# Patient Record
Sex: MALE | Race: WHITE | NOT HISPANIC OR LATINO | Employment: UNEMPLOYED | URBAN - NONMETROPOLITAN AREA
[De-identification: names, ages, dates, MRNs, and addresses within clinical notes are randomized per-mention and may not be internally consistent; named-entity substitution may affect disease eponyms.]

---

## 2019-12-22 ENCOUNTER — APPOINTMENT (OUTPATIENT)
Dept: GENERAL RADIOLOGY | Facility: HOSPITAL | Age: 24
End: 2019-12-22

## 2019-12-22 ENCOUNTER — HOSPITAL ENCOUNTER (EMERGENCY)
Facility: HOSPITAL | Age: 24
Discharge: HOME | End: 2019-12-22
Attending: EMERGENCY MEDICINE | Admitting: EMERGENCY MEDICINE

## 2019-12-22 VITALS
DIASTOLIC BLOOD PRESSURE: 86 MMHG | RESPIRATION RATE: 16 BRPM | HEART RATE: 88 BPM | OXYGEN SATURATION: 100 % | SYSTOLIC BLOOD PRESSURE: 143 MMHG | HEIGHT: 73 IN | WEIGHT: 165 LBS | TEMPERATURE: 97.6 F | BODY MASS INDEX: 21.87 KG/M2

## 2019-12-22 DIAGNOSIS — S92.414A CLOSED NONDISPLACED FRACTURE OF PROXIMAL PHALANX OF RIGHT GREAT TOE, INITIAL ENCOUNTER: Primary | ICD-10-CM

## 2019-12-22 PROCEDURE — 73660 X-RAY EXAM OF TOE(S): CPT

## 2019-12-22 PROCEDURE — 99283 EMERGENCY DEPT VISIT LOW MDM: CPT

## 2019-12-22 RX ORDER — DICLOFENAC SODIUM 75 MG/1
75 TABLET, DELAYED RELEASE ORAL 2 TIMES DAILY PRN
Qty: 20 TABLET | Refills: 0 | Status: SHIPPED | OUTPATIENT
Start: 2019-12-22 | End: 2020-12-28 | Stop reason: HOSPADM

## 2019-12-22 RX ORDER — HYDROCODONE BITARTRATE AND ACETAMINOPHEN 5; 325 MG/1; MG/1
1 TABLET ORAL ONCE
Status: COMPLETED | OUTPATIENT
Start: 2019-12-22 | End: 2019-12-22

## 2019-12-22 RX ADMIN — HYDROCODONE BITARTRATE AND ACETAMINOPHEN 1 TABLET: 5; 325 TABLET ORAL at 07:35

## 2019-12-22 NOTE — ED NOTES
Received report from Silva BRAUN at this time. Pt resting on ED stretcher watching TV. Pt A&Ox4. VSS. No acute respiratory distress noted. Respirations even and nonlabored. Pt c/o pain in right great toe. Updated pt on plan of care and wait times. All needs and concerns addressed. WCTM.     Nasrin Mojica RN  12/22/19 [id number]

## 2019-12-22 NOTE — ED PROVIDER NOTES
Subjective   Patient is a 24-year-old male that presents to the ED for complaint of right great toe pain.  The patient says that he was walking through the house in the dark whenever he stubbed his toe on a box of wood.  The patient says that he has had difficulty walking since.  He reports swelling and redness to the right great toe as well.  He denies any other injury.      History provided by:  Patient   used: No    Toe Injury   Location:  Toe  Time since incident:  6 hours  Injury: yes    Toe location:  R great toe  Pain details:     Quality:  Aching and throbbing    Radiates to:  Does not radiate    Severity:  Moderate    Onset quality:  Sudden    Progression:  Unchanged  Chronicity:  New  Dislocation: no    Foreign body present:  No foreign bodies  Tetanus status:  Unknown  Prior injury to area:  No  Relieved by:  Nothing  Worsened by:  Bearing weight  Ineffective treatments:  None tried  Associated symptoms: decreased ROM and swelling    Risk factors: no concern for non-accidental trauma, no frequent fractures, no obesity and no recent illness        Review of Systems   Constitutional: Negative.    HENT: Negative.    Eyes: Negative.    Respiratory: Negative.    Cardiovascular: Negative.    Gastrointestinal: Negative.    Endocrine: Negative.    Genitourinary: Negative.    Musculoskeletal: Positive for gait problem.   Skin: Negative.    Allergic/Immunologic: Negative.    Hematological: Negative.    Psychiatric/Behavioral: Negative.    All other systems reviewed and are negative.      No past medical history on file.    No Known Allergies    No past surgical history on file.    No family history on file.    Social History     Socioeconomic History   • Marital status: Single     Spouse name: Not on file   • Number of children: Not on file   • Years of education: Not on file   • Highest education level: Not on file           Objective   Physical Exam   Constitutional: He is oriented to person,  place, and time. He appears well-developed and well-nourished.   HENT:   Head: Normocephalic.   Eyes: Pupils are equal, round, and reactive to light. EOM are normal.   Neck: Normal range of motion. Neck supple.   Cardiovascular: Normal rate, regular rhythm, normal heart sounds and intact distal pulses.   Pulmonary/Chest: Effort normal and breath sounds normal.   Abdominal: Soft. Bowel sounds are normal.   Musculoskeletal: He exhibits tenderness.   Tenderness, erythema, swelling to right great toe.  Limited range of motion related to pain.   Neurological: He is alert and oriented to person, place, and time.   Skin: Skin is warm. Capillary refill takes less than 2 seconds.   Psychiatric: He has a normal mood and affect. His behavior is normal. Judgment and thought content normal.   Nursing note and vitals reviewed.      Procedures           ED Course                      No data recorded                        MDM  Number of Diagnoses or Management Options  Closed nondisplaced fracture of proximal phalanx of right great toe, initial encounter: new and requires workup     Amount and/or Complexity of Data Reviewed  Tests in the radiology section of CPT®: reviewed and ordered    Risk of Complications, Morbidity, and/or Mortality  Presenting problems: moderate  Diagnostic procedures: moderate  Management options: moderate  General comments: Right great toe fracture  Dieter taped, boot placed  Follow up with orthopedics   NSAIDS given    Patient Progress  Patient progress: improved      Final diagnoses:   Closed nondisplaced fracture of proximal phalanx of right great toe, initial encounter              Rhianna Vu, APRN  12/22/19 0736

## 2019-12-28 ENCOUNTER — HOSPITAL ENCOUNTER (EMERGENCY)
Facility: HOSPITAL | Age: 24
Discharge: HOME | End: 2019-12-28
Attending: EMERGENCY MEDICINE | Admitting: EMERGENCY MEDICINE

## 2019-12-28 VITALS
BODY MASS INDEX: 21.67 KG/M2 | HEART RATE: 60 BPM | TEMPERATURE: 98 F | HEIGHT: 72 IN | RESPIRATION RATE: 18 BRPM | DIASTOLIC BLOOD PRESSURE: 72 MMHG | SYSTOLIC BLOOD PRESSURE: 118 MMHG | OXYGEN SATURATION: 100 % | WEIGHT: 160 LBS

## 2019-12-28 DIAGNOSIS — F11.10 HEROIN ABUSE (HCC): Primary | ICD-10-CM

## 2019-12-28 LAB
6-ACETYL MORPHINE: POSITIVE
ALBUMIN SERPL-MCNC: 4.2 G/DL (ref 3.5–5.2)
ALBUMIN/GLOB SERPL: 1.6 G/DL
ALP SERPL-CCNC: 95 U/L (ref 39–117)
ALT SERPL W P-5'-P-CCNC: 8 U/L (ref 1–41)
AMPHET+METHAMPHET UR QL: POSITIVE
ANION GAP SERPL CALCULATED.3IONS-SCNC: 11.8 MMOL/L (ref 5–15)
APAP SERPL-MCNC: <5 MCG/ML (ref 10–30)
AST SERPL-CCNC: 11 U/L (ref 1–40)
BACTERIA UR QL AUTO: ABNORMAL /HPF
BARBITURATES UR QL SCN: NEGATIVE
BASOPHILS # BLD AUTO: 0.06 10*3/MM3 (ref 0–0.2)
BASOPHILS NFR BLD AUTO: 0.8 % (ref 0–1.5)
BENZODIAZ UR QL SCN: NEGATIVE
BILIRUB SERPL-MCNC: 0.2 MG/DL (ref 0.2–1.2)
BILIRUB UR QL STRIP: NEGATIVE
BUN BLD-MCNC: 14 MG/DL (ref 6–20)
BUN/CREAT SERPL: 14.4 (ref 7–25)
BUPRENORPHINE SERPL-MCNC: NEGATIVE NG/ML
CALCIUM SPEC-SCNC: 9.1 MG/DL (ref 8.6–10.5)
CANNABINOIDS SERPL QL: NEGATIVE
CHLORIDE SERPL-SCNC: 104 MMOL/L (ref 98–107)
CLARITY UR: CLEAR
CO2 SERPL-SCNC: 25.2 MMOL/L (ref 22–29)
COCAINE UR QL: NEGATIVE
COLOR UR: YELLOW
CREAT BLD-MCNC: 0.97 MG/DL (ref 0.76–1.27)
DEPRECATED RDW RBC AUTO: 37.3 FL (ref 37–54)
EOSINOPHIL # BLD AUTO: 0.4 10*3/MM3 (ref 0–0.4)
EOSINOPHIL NFR BLD AUTO: 5 % (ref 0.3–6.2)
ERYTHROCYTE [DISTWIDTH] IN BLOOD BY AUTOMATED COUNT: 12.2 % (ref 12.3–15.4)
ETHANOL BLD-MCNC: <10 MG/DL (ref 0–10)
ETHANOL UR QL: <0.01 %
GFR SERPL CREATININE-BSD FRML MDRD: 94 ML/MIN/1.73
GLOBULIN UR ELPH-MCNC: 2.6 GM/DL
GLUCOSE BLD-MCNC: 88 MG/DL (ref 65–99)
GLUCOSE UR STRIP-MCNC: NEGATIVE MG/DL
HCT VFR BLD AUTO: 43.9 % (ref 37.5–51)
HGB BLD-MCNC: 14.9 G/DL (ref 13–17.7)
HGB UR QL STRIP.AUTO: NEGATIVE
HYALINE CASTS UR QL AUTO: ABNORMAL /LPF
IMM GRANULOCYTES # BLD AUTO: 0.01 10*3/MM3 (ref 0–0.05)
IMM GRANULOCYTES NFR BLD AUTO: 0.1 % (ref 0–0.5)
KETONES UR QL STRIP: ABNORMAL
LEUKOCYTE ESTERASE UR QL STRIP.AUTO: ABNORMAL
LYMPHOCYTES # BLD AUTO: 3.2 10*3/MM3 (ref 0.7–3.1)
LYMPHOCYTES NFR BLD AUTO: 40.2 % (ref 19.6–45.3)
MCH RBC QN AUTO: 28.7 PG (ref 26.6–33)
MCHC RBC AUTO-ENTMCNC: 33.9 G/DL (ref 31.5–35.7)
MCV RBC AUTO: 84.4 FL (ref 79–97)
METHADONE UR QL SCN: NEGATIVE
MONOCYTES # BLD AUTO: 0.54 10*3/MM3 (ref 0.1–0.9)
MONOCYTES NFR BLD AUTO: 6.8 % (ref 5–12)
NEUTROPHILS # BLD AUTO: 3.75 10*3/MM3 (ref 1.7–7)
NEUTROPHILS NFR BLD AUTO: 47.1 % (ref 42.7–76)
NITRITE UR QL STRIP: NEGATIVE
NRBC BLD AUTO-RTO: 0 /100 WBC (ref 0–0.2)
OPIATES UR QL: POSITIVE
OXYCODONE UR QL SCN: NEGATIVE
PCP UR QL SCN: NEGATIVE
PH UR STRIP.AUTO: 7.5 [PH] (ref 5–8)
PLATELET # BLD AUTO: 245 10*3/MM3 (ref 140–450)
PMV BLD AUTO: 10.8 FL (ref 6–12)
POTASSIUM BLD-SCNC: 4.2 MMOL/L (ref 3.5–5.2)
PROT SERPL-MCNC: 6.8 G/DL (ref 6–8.5)
PROT UR QL STRIP: NEGATIVE
RBC # BLD AUTO: 5.2 10*6/MM3 (ref 4.14–5.8)
RBC # UR: ABNORMAL /HPF
REF LAB TEST METHOD: ABNORMAL
SALICYLATES SERPL-MCNC: <0.3 MG/DL
SODIUM BLD-SCNC: 141 MMOL/L (ref 136–145)
SP GR UR STRIP: >1.03 (ref 1–1.03)
SQUAMOUS #/AREA URNS HPF: ABNORMAL /HPF
UROBILINOGEN UR QL STRIP: ABNORMAL
WBC NRBC COR # BLD: 7.96 10*3/MM3 (ref 3.4–10.8)
WBC UR QL AUTO: ABNORMAL /HPF

## 2019-12-28 PROCEDURE — 81001 URINALYSIS AUTO W/SCOPE: CPT | Performed by: EMERGENCY MEDICINE

## 2019-12-28 PROCEDURE — 80307 DRUG TEST PRSMV CHEM ANLYZR: CPT | Performed by: EMERGENCY MEDICINE

## 2019-12-28 PROCEDURE — 85025 COMPLETE CBC W/AUTO DIFF WBC: CPT | Performed by: EMERGENCY MEDICINE

## 2019-12-28 PROCEDURE — 80053 COMPREHEN METABOLIC PANEL: CPT | Performed by: EMERGENCY MEDICINE

## 2019-12-28 PROCEDURE — 99284 EMERGENCY DEPT VISIT MOD MDM: CPT

## 2020-12-24 ENCOUNTER — HOSPITAL ENCOUNTER (INPATIENT)
Facility: HOSPITAL | Age: 25
LOS: 4 days | Discharge: ANOTHER HOSPITAL | End: 2020-12-28
Attending: EMERGENCY MEDICINE | Admitting: INTERNAL MEDICINE

## 2020-12-24 ENCOUNTER — APPOINTMENT (OUTPATIENT)
Dept: GENERAL RADIOLOGY | Facility: HOSPITAL | Age: 25
End: 2020-12-24

## 2020-12-24 ENCOUNTER — APPOINTMENT (OUTPATIENT)
Dept: CT IMAGING | Facility: HOSPITAL | Age: 25
End: 2020-12-24

## 2020-12-24 DIAGNOSIS — J18.9 PNEUMONIA OF BOTH LOWER LOBES DUE TO INFECTIOUS ORGANISM: Primary | ICD-10-CM

## 2020-12-24 PROBLEM — R00.0 TACHYCARDIA: Status: ACTIVE | Noted: 2020-12-24

## 2020-12-24 PROBLEM — B20 HIV (HUMAN IMMUNODEFICIENCY VIRUS INFECTION) (HCC): Status: ACTIVE | Noted: 2020-12-24

## 2020-12-24 LAB
A-A DO2: ABNORMAL
ALBUMIN SERPL-MCNC: 3 G/DL (ref 3.5–5.2)
ALBUMIN/GLOB SERPL: 0.8 G/DL
ALP SERPL-CCNC: 85 U/L (ref 39–117)
ALT SERPL W P-5'-P-CCNC: 32 U/L (ref 1–41)
AMPHET+METHAMPHET UR QL: NEGATIVE
AMPHETAMINES UR QL: NEGATIVE
ANION GAP SERPL CALCULATED.3IONS-SCNC: 9.4 MMOL/L (ref 5–15)
ARTERIAL PATENCY WRIST A: ABNORMAL
AST SERPL-CCNC: 30 U/L (ref 1–40)
ATMOSPHERIC PRESS: 727 MMHG
BARBITURATES UR QL SCN: NEGATIVE
BASE EXCESS BLDA CALC-SCNC: 1.8 MMOL/L (ref 0–2)
BASOPHILS # BLD AUTO: 0.03 10*3/MM3 (ref 0–0.2)
BASOPHILS NFR BLD AUTO: 0.3 % (ref 0–1.5)
BDY SITE: ABNORMAL
BENZODIAZ UR QL SCN: NEGATIVE
BILIRUB SERPL-MCNC: 0.5 MG/DL (ref 0–1.2)
BILIRUB UR QL STRIP: NEGATIVE
BUN SERPL-MCNC: 8 MG/DL (ref 6–20)
BUN/CREAT SERPL: 8.2 (ref 7–25)
BUPRENORPHINE SERPL-MCNC: NEGATIVE NG/ML
CALCIUM SPEC-SCNC: 8.8 MG/DL (ref 8.6–10.5)
CANNABINOIDS SERPL QL: NEGATIVE
CHLORIDE SERPL-SCNC: 100 MMOL/L (ref 98–107)
CK SERPL-CCNC: 19 U/L (ref 20–200)
CLARITY UR: CLEAR
CO2 SERPL-SCNC: 25.6 MMOL/L (ref 22–29)
COCAINE UR QL: NEGATIVE
COHGB MFR BLD: 0.9 % (ref 0–2)
COLOR UR: YELLOW
CREAT SERPL-MCNC: 0.97 MG/DL (ref 0.76–1.27)
CRP SERPL-MCNC: 7.78 MG/DL (ref 0–0.5)
D DIMER PPP FEU-MCNC: 0.96 MCGFEU/ML (ref 0–0.57)
D-LACTATE SERPL-SCNC: 1.6 MMOL/L (ref 0.5–2)
DEPRECATED RDW RBC AUTO: 41.4 FL (ref 37–54)
EOSINOPHIL # BLD AUTO: 0.13 10*3/MM3 (ref 0–0.4)
EOSINOPHIL NFR BLD AUTO: 1.1 % (ref 0.3–6.2)
ERYTHROCYTE [DISTWIDTH] IN BLOOD BY AUTOMATED COUNT: 14.2 % (ref 12.3–15.4)
FERRITIN SERPL-MCNC: 522.3 NG/ML (ref 30–400)
GFR SERPL CREATININE-BSD FRML MDRD: 94 ML/MIN/1.73
GLOBULIN UR ELPH-MCNC: 3.8 GM/DL
GLUCOSE SERPL-MCNC: 94 MG/DL (ref 65–99)
GLUCOSE UR STRIP-MCNC: NEGATIVE MG/DL
HCO3 BLDA-SCNC: 25 MMOL/L (ref 22–28)
HCT VFR BLD AUTO: 30.2 % (ref 37.5–51)
HCT VFR BLD CALC: 24.7 %
HGB BLD-MCNC: 9.5 G/DL (ref 13–17.7)
HGB UR QL STRIP.AUTO: NEGATIVE
HOLD SPECIMEN: NORMAL
HOLD SPECIMEN: NORMAL
IMM GRANULOCYTES # BLD AUTO: 0.1 10*3/MM3 (ref 0–0.05)
IMM GRANULOCYTES NFR BLD AUTO: 0.9 % (ref 0–0.5)
INHALED O2 CONCENTRATION: 21 %
KETONES UR QL STRIP: NEGATIVE
LEUKOCYTE ESTERASE UR QL STRIP.AUTO: NEGATIVE
LYMPHOCYTES # BLD AUTO: 1.94 10*3/MM3 (ref 0.7–3.1)
LYMPHOCYTES NFR BLD AUTO: 17 % (ref 19.6–45.3)
MCH RBC QN AUTO: 25.7 PG (ref 26.6–33)
MCHC RBC AUTO-ENTMCNC: 31.5 G/DL (ref 31.5–35.7)
MCV RBC AUTO: 81.8 FL (ref 79–97)
METHADONE UR QL SCN: NEGATIVE
METHGB BLD QL: 1.2 % (ref 0–1.5)
MODALITY: ABNORMAL
MONOCYTES # BLD AUTO: 0.8 10*3/MM3 (ref 0.1–0.9)
MONOCYTES NFR BLD AUTO: 7 % (ref 5–12)
NEUTROPHILS NFR BLD AUTO: 73.7 % (ref 42.7–76)
NEUTROPHILS NFR BLD AUTO: 8.4 10*3/MM3 (ref 1.7–7)
NITRITE UR QL STRIP: NEGATIVE
NOTE: ABNORMAL
NRBC BLD AUTO-RTO: 0 /100 WBC (ref 0–0.2)
OPIATES UR QL: NEGATIVE
OXYCODONE UR QL SCN: NEGATIVE
OXYHGB MFR BLDV: 96.6 % (ref 94–99)
PCO2 BLDA: 32.7 MM HG (ref 35–45)
PCO2 TEMP ADJ BLD: ABNORMAL MM[HG]
PCP UR QL SCN: NEGATIVE
PH BLDA: 7.49 PH UNITS (ref 7.3–7.5)
PH UR STRIP.AUTO: 6.5 [PH] (ref 5–8)
PH, TEMP CORRECTED: ABNORMAL
PLATELET # BLD AUTO: 222 10*3/MM3 (ref 140–450)
PMV BLD AUTO: 9.9 FL (ref 6–12)
PO2 BLDA: 108 MM HG (ref 75–100)
PO2 TEMP ADJ BLD: ABNORMAL MM[HG]
POTASSIUM SERPL-SCNC: 4.2 MMOL/L (ref 3.5–5.2)
PROCALCITONIN SERPL-MCNC: 0.15 NG/ML (ref 0–0.25)
PROPOXYPH UR QL: NEGATIVE
PROT SERPL-MCNC: 6.8 G/DL (ref 6–8.5)
PROT UR QL STRIP: NEGATIVE
RBC # BLD AUTO: 3.69 10*6/MM3 (ref 4.14–5.8)
SAO2 % BLDCOA: 98.7 % (ref 94–100)
SARS-COV-2 RNA PNL SPEC NAA+PROBE: NOT DETECTED
SODIUM SERPL-SCNC: 135 MMOL/L (ref 136–145)
SP GR UR STRIP: 1.01 (ref 1–1.03)
TRICYCLICS UR QL SCN: NEGATIVE
TSH SERPL DL<=0.05 MIU/L-ACNC: 2.02 UIU/ML (ref 0.27–4.2)
UROBILINOGEN UR QL STRIP: NORMAL
VENTILATOR MODE: ABNORMAL
WBC # BLD AUTO: 11.4 10*3/MM3 (ref 3.4–10.8)
WHOLE BLOOD HOLD SPECIMEN: NORMAL
WHOLE BLOOD HOLD SPECIMEN: NORMAL

## 2020-12-24 PROCEDURE — 87150 DNA/RNA AMPLIFIED PROBE: CPT | Performed by: PHYSICIAN ASSISTANT

## 2020-12-24 PROCEDURE — 99222 1ST HOSP IP/OBS MODERATE 55: CPT | Performed by: INTERNAL MEDICINE

## 2020-12-24 PROCEDURE — 80306 DRUG TEST PRSMV INSTRMNT: CPT | Performed by: PHYSICIAN ASSISTANT

## 2020-12-24 PROCEDURE — 80053 COMPREHEN METABOLIC PANEL: CPT | Performed by: PHYSICIAN ASSISTANT

## 2020-12-24 PROCEDURE — 84145 PROCALCITONIN (PCT): CPT | Performed by: PHYSICIAN ASSISTANT

## 2020-12-24 PROCEDURE — 82728 ASSAY OF FERRITIN: CPT | Performed by: PHYSICIAN ASSISTANT

## 2020-12-24 PROCEDURE — 71275 CT ANGIOGRAPHY CHEST: CPT

## 2020-12-24 PROCEDURE — 87070 CULTURE OTHR SPECIMN AEROBIC: CPT | Performed by: PHYSICIAN ASSISTANT

## 2020-12-24 PROCEDURE — 86140 C-REACTIVE PROTEIN: CPT | Performed by: PHYSICIAN ASSISTANT

## 2020-12-24 PROCEDURE — 25010000002 IOPAMIDOL 61 % SOLUTION: Performed by: EMERGENCY MEDICINE

## 2020-12-24 PROCEDURE — 84443 ASSAY THYROID STIM HORMONE: CPT | Performed by: PHYSICIAN ASSISTANT

## 2020-12-24 PROCEDURE — 85025 COMPLETE CBC W/AUTO DIFF WBC: CPT | Performed by: PHYSICIAN ASSISTANT

## 2020-12-24 PROCEDURE — 87205 SMEAR GRAM STAIN: CPT | Performed by: PHYSICIAN ASSISTANT

## 2020-12-24 PROCEDURE — 25010000002 AZITHROMYCIN 500 MG/250 ML: Performed by: PHYSICIAN ASSISTANT

## 2020-12-24 PROCEDURE — 87635 SARS-COV-2 COVID-19 AMP PRB: CPT | Performed by: PHYSICIAN ASSISTANT

## 2020-12-24 PROCEDURE — 93005 ELECTROCARDIOGRAM TRACING: CPT | Performed by: PHYSICIAN ASSISTANT

## 2020-12-24 PROCEDURE — 71045 X-RAY EXAM CHEST 1 VIEW: CPT

## 2020-12-24 PROCEDURE — 25010000002 ENOXAPARIN PER 10 MG: Performed by: INTERNAL MEDICINE

## 2020-12-24 PROCEDURE — 82805 BLOOD GASES W/O2 SATURATION: CPT

## 2020-12-24 PROCEDURE — 99285 EMERGENCY DEPT VISIT HI MDM: CPT

## 2020-12-24 PROCEDURE — 87040 BLOOD CULTURE FOR BACTERIA: CPT | Performed by: PHYSICIAN ASSISTANT

## 2020-12-24 PROCEDURE — 83605 ASSAY OF LACTIC ACID: CPT | Performed by: PHYSICIAN ASSISTANT

## 2020-12-24 PROCEDURE — 86361 T CELL ABSOLUTE COUNT: CPT | Performed by: PHYSICIAN ASSISTANT

## 2020-12-24 PROCEDURE — 36600 WITHDRAWAL OF ARTERIAL BLOOD: CPT

## 2020-12-24 PROCEDURE — 83050 HGB METHEMOGLOBIN QUAN: CPT

## 2020-12-24 PROCEDURE — 82550 ASSAY OF CK (CPK): CPT | Performed by: PHYSICIAN ASSISTANT

## 2020-12-24 PROCEDURE — 85379 FIBRIN DEGRADATION QUANT: CPT | Performed by: PHYSICIAN ASSISTANT

## 2020-12-24 PROCEDURE — 81003 URINALYSIS AUTO W/O SCOPE: CPT | Performed by: PHYSICIAN ASSISTANT

## 2020-12-24 PROCEDURE — 87186 SC STD MICRODIL/AGAR DIL: CPT | Performed by: PHYSICIAN ASSISTANT

## 2020-12-24 PROCEDURE — 25010000002 CEFTRIAXONE PER 250 MG: Performed by: PHYSICIAN ASSISTANT

## 2020-12-24 PROCEDURE — 82375 ASSAY CARBOXYHB QUANT: CPT

## 2020-12-24 RX ORDER — ACETAMINOPHEN 325 MG/1
650 TABLET ORAL EVERY 4 HOURS PRN
Status: DISCONTINUED | OUTPATIENT
Start: 2020-12-24 | End: 2020-12-28 | Stop reason: HOSPADM

## 2020-12-24 RX ORDER — SODIUM CHLORIDE 0.9 % (FLUSH) 0.9 %
10 SYRINGE (ML) INJECTION AS NEEDED
Status: DISCONTINUED | OUTPATIENT
Start: 2020-12-24 | End: 2020-12-28 | Stop reason: HOSPADM

## 2020-12-24 RX ORDER — ONDANSETRON 4 MG/1
4 TABLET, FILM COATED ORAL EVERY 6 HOURS PRN
Status: DISCONTINUED | OUTPATIENT
Start: 2020-12-24 | End: 2020-12-28 | Stop reason: HOSPADM

## 2020-12-24 RX ORDER — METOPROLOL TARTRATE 5 MG/5ML
5 INJECTION INTRAVENOUS ONCE
Status: COMPLETED | OUTPATIENT
Start: 2020-12-24 | End: 2020-12-24

## 2020-12-24 RX ORDER — SODIUM CHLORIDE 9 MG/ML
150 INJECTION, SOLUTION INTRAVENOUS CONTINUOUS
Status: DISCONTINUED | OUTPATIENT
Start: 2020-12-24 | End: 2020-12-27

## 2020-12-24 RX ORDER — METOPROLOL TARTRATE 5 MG/5ML
5 INJECTION INTRAVENOUS EVERY 4 HOURS PRN
Status: DISCONTINUED | OUTPATIENT
Start: 2020-12-24 | End: 2020-12-28 | Stop reason: HOSPADM

## 2020-12-24 RX ORDER — SODIUM CHLORIDE 0.9 % (FLUSH) 0.9 %
10 SYRINGE (ML) INJECTION EVERY 12 HOURS SCHEDULED
Status: DISCONTINUED | OUTPATIENT
Start: 2020-12-24 | End: 2020-12-28 | Stop reason: HOSPADM

## 2020-12-24 RX ORDER — ONDANSETRON 2 MG/ML
4 INJECTION INTRAMUSCULAR; INTRAVENOUS EVERY 6 HOURS PRN
Status: DISCONTINUED | OUTPATIENT
Start: 2020-12-24 | End: 2020-12-28 | Stop reason: HOSPADM

## 2020-12-24 RX ADMIN — IOPAMIDOL 100 ML: 612 INJECTION, SOLUTION INTRAVENOUS at 13:58

## 2020-12-24 RX ADMIN — ACETAMINOPHEN 650 MG: 325 TABLET, FILM COATED ORAL at 16:53

## 2020-12-24 RX ADMIN — SODIUM CHLORIDE 100 ML/HR: 9 INJECTION, SOLUTION INTRAVENOUS at 16:53

## 2020-12-24 RX ADMIN — SODIUM CHLORIDE 1000 ML: 9 INJECTION, SOLUTION INTRAVENOUS at 11:39

## 2020-12-24 RX ADMIN — AZITHROMYCIN 500 MG: 500 INJECTION, POWDER, LYOPHILIZED, FOR SOLUTION INTRAVENOUS at 12:59

## 2020-12-24 RX ADMIN — CEFTRIAXONE SODIUM 2 G: 1 INJECTION, POWDER, FOR SOLUTION INTRAMUSCULAR; INTRAVENOUS at 12:25

## 2020-12-24 RX ADMIN — SODIUM CHLORIDE 1000 ML: 9 INJECTION, SOLUTION INTRAVENOUS at 13:02

## 2020-12-24 RX ADMIN — ENOXAPARIN SODIUM 40 MG: 40 INJECTION SUBCUTANEOUS at 16:53

## 2020-12-24 RX ADMIN — METOPROLOL TARTRATE 5 MG: 1 INJECTION, SOLUTION INTRAVENOUS at 21:38

## 2020-12-24 RX ADMIN — ACETAMINOPHEN 650 MG: 325 TABLET, FILM COATED ORAL at 21:18

## 2020-12-24 RX ADMIN — SODIUM CHLORIDE 500 ML: 9 INJECTION, SOLUTION INTRAVENOUS at 21:39

## 2020-12-24 RX ADMIN — METOPROLOL TARTRATE 5 MG: 1 INJECTION, SOLUTION INTRAVENOUS at 11:40

## 2020-12-25 LAB
ALBUMIN SERPL-MCNC: 2.3 G/DL (ref 3.5–5.2)
ALBUMIN/GLOB SERPL: 0.6 G/DL
ALP SERPL-CCNC: 76 U/L (ref 39–117)
ALT SERPL W P-5'-P-CCNC: 29 U/L (ref 1–41)
ANION GAP SERPL CALCULATED.3IONS-SCNC: 6.4 MMOL/L (ref 5–15)
AST SERPL-CCNC: 30 U/L (ref 1–40)
BACTERIA BLD CULT: ABNORMAL
BASOPHILS # BLD MANUAL: 0.09 10*3/MM3 (ref 0–0.2)
BASOPHILS NFR BLD AUTO: 1 % (ref 0–1.5)
BILIRUB SERPL-MCNC: 0.3 MG/DL (ref 0–1.2)
BOTTLE TYPE: ABNORMAL
BUN SERPL-MCNC: 8 MG/DL (ref 6–20)
BUN/CREAT SERPL: 10.7 (ref 7–25)
CALCIUM SPEC-SCNC: 8.1 MG/DL (ref 8.6–10.5)
CHLORIDE SERPL-SCNC: 112 MMOL/L (ref 98–107)
CO2 SERPL-SCNC: 24.6 MMOL/L (ref 22–29)
CREAT SERPL-MCNC: 0.75 MG/DL (ref 0.76–1.27)
DEPRECATED RDW RBC AUTO: 44.5 FL (ref 37–54)
EOSINOPHIL # BLD MANUAL: 0.26 10*3/MM3 (ref 0–0.4)
EOSINOPHIL NFR BLD MANUAL: 3 % (ref 0.3–6.2)
ERYTHROCYTE [DISTWIDTH] IN BLOOD BY AUTOMATED COUNT: 14.5 % (ref 12.3–15.4)
GFR SERPL CREATININE-BSD FRML MDRD: 126 ML/MIN/1.73
GLOBULIN UR ELPH-MCNC: 3.6 GM/DL
GLUCOSE SERPL-MCNC: 86 MG/DL (ref 65–99)
HCT VFR BLD AUTO: 28.7 % (ref 37.5–51)
HGB BLD-MCNC: 8.6 G/DL (ref 13–17.7)
LDH SERPL-CCNC: 412 U/L (ref 135–225)
LYMPHOCYTES # BLD MANUAL: 1.54 10*3/MM3 (ref 0.7–3.1)
LYMPHOCYTES NFR BLD MANUAL: 18 % (ref 19.6–45.3)
LYMPHOCYTES NFR BLD MANUAL: 7 % (ref 5–12)
MCH RBC QN AUTO: 25.6 PG (ref 26.6–33)
MCHC RBC AUTO-ENTMCNC: 30 G/DL (ref 31.5–35.7)
MCV RBC AUTO: 85.4 FL (ref 79–97)
MONOCYTES # BLD AUTO: 0.6 10*3/MM3 (ref 0.1–0.9)
NEUTROPHILS # BLD AUTO: 6.06 10*3/MM3 (ref 1.7–7)
NEUTROPHILS NFR BLD MANUAL: 69 % (ref 42.7–76)
NEUTS BAND NFR BLD MANUAL: 2 % (ref 0–5)
PLAT MORPH BLD: NORMAL
PLATELET # BLD AUTO: 179 10*3/MM3 (ref 140–450)
PMV BLD AUTO: 9.7 FL (ref 6–12)
POTASSIUM SERPL-SCNC: 4.3 MMOL/L (ref 3.5–5.2)
PROT SERPL-MCNC: 5.9 G/DL (ref 6–8.5)
RBC # BLD AUTO: 3.36 10*6/MM3 (ref 4.14–5.8)
RBC MORPH BLD: NORMAL
SODIUM SERPL-SCNC: 143 MMOL/L (ref 136–145)
WBC # BLD AUTO: 8.54 10*3/MM3 (ref 3.4–10.8)
WBC MORPH BLD: NORMAL

## 2020-12-25 PROCEDURE — 25010000002 AZITHROMYCIN 500 MG/250 ML: Performed by: INTERNAL MEDICINE

## 2020-12-25 PROCEDURE — 25010000002 PIPERACILLIN SOD-TAZOBACTAM PER 1 G: Performed by: INTERNAL MEDICINE

## 2020-12-25 PROCEDURE — 25010000002 ENOXAPARIN PER 10 MG: Performed by: INTERNAL MEDICINE

## 2020-12-25 PROCEDURE — 80053 COMPREHEN METABOLIC PANEL: CPT | Performed by: INTERNAL MEDICINE

## 2020-12-25 PROCEDURE — 99232 SBSQ HOSP IP/OBS MODERATE 35: CPT | Performed by: INTERNAL MEDICINE

## 2020-12-25 PROCEDURE — 85007 BL SMEAR W/DIFF WBC COUNT: CPT | Performed by: INTERNAL MEDICINE

## 2020-12-25 PROCEDURE — 25010000002 KETOROLAC TROMETHAMINE PER 15 MG: Performed by: FAMILY MEDICINE

## 2020-12-25 PROCEDURE — 85027 COMPLETE CBC AUTOMATED: CPT | Performed by: INTERNAL MEDICINE

## 2020-12-25 PROCEDURE — 83615 LACTATE (LD) (LDH) ENZYME: CPT | Performed by: INTERNAL MEDICINE

## 2020-12-25 PROCEDURE — 25010000002 CEFTRIAXONE PER 250 MG: Performed by: INTERNAL MEDICINE

## 2020-12-25 RX ORDER — KETOROLAC TROMETHAMINE 30 MG/ML
15 INJECTION, SOLUTION INTRAMUSCULAR; INTRAVENOUS EVERY 6 HOURS PRN
Status: DISCONTINUED | OUTPATIENT
Start: 2020-12-25 | End: 2020-12-27

## 2020-12-25 RX ADMIN — ACETAMINOPHEN 650 MG: 325 TABLET, FILM COATED ORAL at 13:59

## 2020-12-25 RX ADMIN — AZITHROMYCIN 500 MG: 500 INJECTION, POWDER, LYOPHILIZED, FOR SOLUTION INTRAVENOUS at 13:18

## 2020-12-25 RX ADMIN — METOPROLOL TARTRATE 12.5 MG: 25 TABLET, FILM COATED ORAL at 14:03

## 2020-12-25 RX ADMIN — METOPROLOL TARTRATE 12.5 MG: 25 TABLET, FILM COATED ORAL at 21:00

## 2020-12-25 RX ADMIN — KETOROLAC TROMETHAMINE 15 MG: 30 INJECTION, SOLUTION INTRAMUSCULAR; INTRAVENOUS at 01:20

## 2020-12-25 RX ADMIN — TAZOBACTAM SODIUM AND PIPERACILLIN SODIUM 3.38 G: 375; 3 INJECTION, SOLUTION INTRAVENOUS at 14:27

## 2020-12-25 RX ADMIN — SODIUM CHLORIDE 150 ML/HR: 9 INJECTION, SOLUTION INTRAVENOUS at 06:05

## 2020-12-25 RX ADMIN — METOPROLOL TARTRATE 5 MG: 1 INJECTION, SOLUTION INTRAVENOUS at 11:36

## 2020-12-25 RX ADMIN — TAZOBACTAM SODIUM AND PIPERACILLIN SODIUM 3.38 G: 375; 3 INJECTION, SOLUTION INTRAVENOUS at 20:59

## 2020-12-25 RX ADMIN — SODIUM CHLORIDE, PRESERVATIVE FREE 10 ML: 5 INJECTION INTRAVENOUS at 21:00

## 2020-12-25 RX ADMIN — ENOXAPARIN SODIUM 40 MG: 40 INJECTION SUBCUTANEOUS at 16:25

## 2020-12-25 RX ADMIN — CEFTRIAXONE SODIUM 1 G: 1 INJECTION, POWDER, FOR SOLUTION INTRAMUSCULAR; INTRAVENOUS at 11:30

## 2020-12-25 RX ADMIN — METOPROLOL TARTRATE 5 MG: 1 INJECTION, SOLUTION INTRAVENOUS at 16:21

## 2020-12-25 RX ADMIN — BICTEGRAVIR SODIUM, EMTRICITABINE, AND TENOFOVIR ALAFENAMIDE FUMARATE 1 TABLET: 50; 200; 25 TABLET ORAL at 09:16

## 2020-12-26 PROBLEM — A40.9 SEPSIS DUE TO STREPTOCOCCUS SPECIES WITHOUT ACUTE ORGAN DYSFUNCTION (HCC): Status: ACTIVE | Noted: 2020-12-26

## 2020-12-26 LAB
BACTERIA BLD CULT: ABNORMAL
BACTERIA SPEC RESP CULT: NORMAL
BASOPHILS # BLD AUTO: 0 X10E3/UL (ref 0–0.2)
BASOPHILS NFR BLD AUTO: 0 %
CD3+CD4+ CELLS # BLD: 497 /UL (ref 359–1519)
CD3+CD4+ CELLS NFR BLD: 35.5 % (ref 30.8–58.5)
EOSINOPHIL # BLD AUTO: 0.1 X10E3/UL (ref 0–0.4)
EOSINOPHIL NFR BLD AUTO: 2 %
ERYTHROCYTE [DISTWIDTH] IN BLOOD BY AUTOMATED COUNT: 15.2 % (ref 11.6–15.4)
GRAM STN SPEC: NORMAL
HCT VFR BLD AUTO: 26 % (ref 37.5–51)
HGB BLD-MCNC: 8 G/DL (ref 13–17.7)
IMM GRANULOCYTES # BLD AUTO: 0.1 X10E3/UL (ref 0–0.1)
IMM GRANULOCYTES NFR BLD AUTO: 1 %
LYMPHOCYTES # BLD AUTO: 1.4 X10E3/UL (ref 0.7–3.1)
LYMPHOCYTES NFR BLD AUTO: 17 %
MCH RBC QN AUTO: 25.7 PG (ref 26.6–33)
MCHC RBC AUTO-ENTMCNC: 30.8 G/DL (ref 31.5–35.7)
MCV RBC AUTO: 84 FL (ref 79–97)
MONOCYTES # BLD AUTO: 0.5 X10E3/UL (ref 0.1–0.9)
MONOCYTES NFR BLD AUTO: 6 %
NEUTROPHILS # BLD AUTO: 6.3 X10E3/UL (ref 1.4–7)
NEUTROPHILS NFR BLD AUTO: 74 %
PLATELET # BLD AUTO: 204 X10E3/UL (ref 150–450)
RBC # BLD AUTO: 3.11 X10E6/UL (ref 4.14–5.8)
WBC # BLD AUTO: 8.5 X10E3/UL (ref 3.4–10.8)

## 2020-12-26 PROCEDURE — 25010000002 KETOROLAC TROMETHAMINE PER 15 MG: Performed by: FAMILY MEDICINE

## 2020-12-26 PROCEDURE — 25010000002 PIPERACILLIN SOD-TAZOBACTAM PER 1 G: Performed by: INTERNAL MEDICINE

## 2020-12-26 PROCEDURE — 99232 SBSQ HOSP IP/OBS MODERATE 35: CPT | Performed by: INTERNAL MEDICINE

## 2020-12-26 PROCEDURE — 25010000002 ENOXAPARIN PER 10 MG: Performed by: INTERNAL MEDICINE

## 2020-12-26 PROCEDURE — 25010000002 AZITHROMYCIN 500 MG/250 ML: Performed by: INTERNAL MEDICINE

## 2020-12-26 RX ORDER — L.ACID,PARA/B.BIFIDUM/S.THERM 8B CELL
1 CAPSULE ORAL 2 TIMES DAILY
Status: DISCONTINUED | OUTPATIENT
Start: 2020-12-26 | End: 2020-12-28 | Stop reason: HOSPADM

## 2020-12-26 RX ORDER — CHOLECALCIFEROL (VITAMIN D3) 125 MCG
5 CAPSULE ORAL NIGHTLY PRN
Status: DISCONTINUED | OUTPATIENT
Start: 2020-12-26 | End: 2020-12-28 | Stop reason: HOSPADM

## 2020-12-26 RX ADMIN — Medication 5 MG: at 23:26

## 2020-12-26 RX ADMIN — KETOROLAC TROMETHAMINE 15 MG: 30 INJECTION, SOLUTION INTRAMUSCULAR; INTRAVENOUS at 08:40

## 2020-12-26 RX ADMIN — TAZOBACTAM SODIUM AND PIPERACILLIN SODIUM 3.38 G: 375; 3 INJECTION, SOLUTION INTRAVENOUS at 04:35

## 2020-12-26 RX ADMIN — AZITHROMYCIN 500 MG: 500 INJECTION, POWDER, LYOPHILIZED, FOR SOLUTION INTRAVENOUS at 13:59

## 2020-12-26 RX ADMIN — METOPROLOL TARTRATE 5 MG: 1 INJECTION, SOLUTION INTRAVENOUS at 23:27

## 2020-12-26 RX ADMIN — Medication 1 CAPSULE: at 20:52

## 2020-12-26 RX ADMIN — SODIUM CHLORIDE 150 ML/HR: 9 INJECTION, SOLUTION INTRAVENOUS at 01:59

## 2020-12-26 RX ADMIN — ENOXAPARIN SODIUM 40 MG: 40 INJECTION SUBCUTANEOUS at 17:40

## 2020-12-26 RX ADMIN — TAZOBACTAM SODIUM AND PIPERACILLIN SODIUM 3.38 G: 375; 3 INJECTION, SOLUTION INTRAVENOUS at 20:52

## 2020-12-26 RX ADMIN — Medication 1 CAPSULE: at 09:53

## 2020-12-26 RX ADMIN — TAZOBACTAM SODIUM AND PIPERACILLIN SODIUM 3.38 G: 375; 3 INJECTION, SOLUTION INTRAVENOUS at 11:18

## 2020-12-26 RX ADMIN — BICTEGRAVIR SODIUM, EMTRICITABINE, AND TENOFOVIR ALAFENAMIDE FUMARATE 1 TABLET: 50; 200; 25 TABLET ORAL at 08:40

## 2020-12-26 RX ADMIN — METOPROLOL TARTRATE 12.5 MG: 25 TABLET, FILM COATED ORAL at 20:51

## 2020-12-26 RX ADMIN — METOPROLOL TARTRATE 12.5 MG: 25 TABLET, FILM COATED ORAL at 08:40

## 2020-12-26 RX ADMIN — SODIUM CHLORIDE, PRESERVATIVE FREE 10 ML: 5 INJECTION INTRAVENOUS at 08:41

## 2020-12-26 RX ADMIN — METOPROLOL TARTRATE 5 MG: 1 INJECTION, SOLUTION INTRAVENOUS at 04:35

## 2020-12-27 ENCOUNTER — APPOINTMENT (OUTPATIENT)
Dept: CARDIOLOGY | Facility: HOSPITAL | Age: 25
End: 2020-12-27

## 2020-12-27 ENCOUNTER — APPOINTMENT (OUTPATIENT)
Dept: CT IMAGING | Facility: HOSPITAL | Age: 25
End: 2020-12-27

## 2020-12-27 VITALS
BODY MASS INDEX: 20.32 KG/M2 | HEIGHT: 72 IN | WEIGHT: 150 LBS | RESPIRATION RATE: 18 BRPM | OXYGEN SATURATION: 97 % | HEART RATE: 101 BPM | TEMPERATURE: 99 F | DIASTOLIC BLOOD PRESSURE: 51 MMHG | SYSTOLIC BLOOD PRESSURE: 103 MMHG

## 2020-12-27 LAB
BACTERIA SPEC AEROBE CULT: ABNORMAL
BACTERIA SPEC AEROBE CULT: ABNORMAL
BH CV ECHO MEAS - IVSD: 1 CM
BH CV ECHO MEAS - LA DIMENSION: 3 CM
BH CV ECHO MEAS - LVPWD: 1 CM
BH CV ECHO MEAS - RAP SYSTOLE: 3 MMHG
BH CV ECHO MEAS - RVSP: 26 MMHG
GRAM STN SPEC: ABNORMAL
ISOLATED FROM: ABNORMAL
ISOLATED FROM: ABNORMAL
LEFT ATRIUM VOLUME INDEX: 30 ML/M2
LV EF 2D ECHO EST: 68 %
MAXIMAL PREDICTED HEART RATE: 194 BPM
STRESS TARGET HR: 165 BPM

## 2020-12-27 PROCEDURE — 93306 TTE W/DOPPLER COMPLETE: CPT

## 2020-12-27 PROCEDURE — 25010000002 AZITHROMYCIN 500 MG/250 ML: Performed by: INTERNAL MEDICINE

## 2020-12-27 PROCEDURE — 99239 HOSP IP/OBS DSCHRG MGMT >30: CPT | Performed by: INTERNAL MEDICINE

## 2020-12-27 PROCEDURE — 25010000002 PIPERACILLIN SOD-TAZOBACTAM PER 1 G: Performed by: INTERNAL MEDICINE

## 2020-12-27 PROCEDURE — 25010000002 ENOXAPARIN PER 10 MG: Performed by: INTERNAL MEDICINE

## 2020-12-27 PROCEDURE — 25010000002 PENICILLIN G POTASSIUM PER 600000 UNITS: Performed by: INTERNAL MEDICINE

## 2020-12-27 PROCEDURE — 93306 TTE W/DOPPLER COMPLETE: CPT | Performed by: INTERNAL MEDICINE

## 2020-12-27 PROCEDURE — 70450 CT HEAD/BRAIN W/O DYE: CPT

## 2020-12-27 RX ORDER — PENICILLIN G 3000000 [IU]/50ML
3 INJECTION, SOLUTION INTRAVENOUS
Status: DISCONTINUED | OUTPATIENT
Start: 2020-12-27 | End: 2020-12-28 | Stop reason: HOSPADM

## 2020-12-27 RX ORDER — METOPROLOL TARTRATE 5 MG/5ML
5 INJECTION INTRAVENOUS EVERY 4 HOURS PRN
Qty: 15 ML
Start: 2020-12-27

## 2020-12-27 RX ORDER — ONDANSETRON 4 MG/1
4 TABLET, FILM COATED ORAL EVERY 6 HOURS PRN
Start: 2020-12-27

## 2020-12-27 RX ORDER — PENICILLIN G 3000000 [IU]/50ML
3 INJECTION, SOLUTION INTRAVENOUS
Qty: 5450 ML | Refills: 0
Start: 2020-12-28 | End: 2021-01-11

## 2020-12-27 RX ORDER — TRAZODONE HYDROCHLORIDE 50 MG/1
50 TABLET ORAL NIGHTLY PRN
Start: 2020-12-27

## 2020-12-27 RX ORDER — CHOLECALCIFEROL (VITAMIN D3) 125 MCG
5 CAPSULE ORAL NIGHTLY PRN
Start: 2020-12-27

## 2020-12-27 RX ORDER — TRAZODONE HYDROCHLORIDE 50 MG/1
50 TABLET ORAL NIGHTLY PRN
Status: DISCONTINUED | OUTPATIENT
Start: 2020-12-27 | End: 2020-12-28 | Stop reason: HOSPADM

## 2020-12-27 RX ORDER — ACETAMINOPHEN 325 MG/1
650 TABLET ORAL EVERY 4 HOURS PRN
Start: 2020-12-27

## 2020-12-27 RX ORDER — L.ACID,PARA/B.BIFIDUM/S.THERM 8B CELL
1 CAPSULE ORAL 2 TIMES DAILY
Start: 2020-12-28

## 2020-12-27 RX ADMIN — AZITHROMYCIN 500 MG: 500 INJECTION, POWDER, LYOPHILIZED, FOR SOLUTION INTRAVENOUS at 13:17

## 2020-12-27 RX ADMIN — TAZOBACTAM SODIUM AND PIPERACILLIN SODIUM 3.38 G: 375; 3 INJECTION, SOLUTION INTRAVENOUS at 11:59

## 2020-12-27 RX ADMIN — METOPROLOL TARTRATE 12.5 MG: 25 TABLET, FILM COATED ORAL at 20:18

## 2020-12-27 RX ADMIN — Medication 1 CAPSULE: at 08:07

## 2020-12-27 RX ADMIN — ENOXAPARIN SODIUM 40 MG: 40 INJECTION SUBCUTANEOUS at 17:54

## 2020-12-27 RX ADMIN — SODIUM CHLORIDE 150 ML/HR: 9 INJECTION, SOLUTION INTRAVENOUS at 01:03

## 2020-12-27 RX ADMIN — PENICILLIN G 3 MILLION UNITS: 3000000 INJECTION, SOLUTION INTRAVENOUS at 16:35

## 2020-12-27 RX ADMIN — TRAZODONE HYDROCHLORIDE 50 MG: 50 TABLET ORAL at 02:23

## 2020-12-27 RX ADMIN — Medication 1 CAPSULE: at 20:17

## 2020-12-27 RX ADMIN — Medication 5 MG: at 20:18

## 2020-12-27 RX ADMIN — ACETAMINOPHEN 650 MG: 325 TABLET, FILM COATED ORAL at 20:16

## 2020-12-27 RX ADMIN — METOPROLOL TARTRATE 12.5 MG: 25 TABLET, FILM COATED ORAL at 08:07

## 2020-12-27 RX ADMIN — PENICILLIN G 3 MILLION UNITS: 3000000 INJECTION, SOLUTION INTRAVENOUS at 22:40

## 2020-12-27 RX ADMIN — TRAZODONE HYDROCHLORIDE 50 MG: 50 TABLET ORAL at 20:17

## 2020-12-27 RX ADMIN — TAZOBACTAM SODIUM AND PIPERACILLIN SODIUM 3.38 G: 375; 3 INJECTION, SOLUTION INTRAVENOUS at 05:13

## 2020-12-27 RX ADMIN — PENICILLIN G 3 MILLION UNITS: 3000000 INJECTION, SOLUTION INTRAVENOUS at 18:48

## 2020-12-27 RX ADMIN — SODIUM CHLORIDE, PRESERVATIVE FREE 10 ML: 5 INJECTION INTRAVENOUS at 08:07

## 2020-12-27 RX ADMIN — BICTEGRAVIR SODIUM, EMTRICITABINE, AND TENOFOVIR ALAFENAMIDE FUMARATE 1 TABLET: 50; 200; 25 TABLET ORAL at 08:07

## 2021-01-04 LAB
LEGIONELLA LOWER RESP CULT: NORMAL
LEGIONELLA SPEC CULT: NORMAL

## 2021-01-15 ENCOUNTER — HOSPITAL ENCOUNTER (INPATIENT)
Facility: HOSPITAL | Age: 26
LOS: 10 days | Discharge: HOME | DRG: 288 | End: 2021-01-25
Attending: INTERNAL MEDICINE | Admitting: INTERNAL MEDICINE
Payer: MEDICAID

## 2021-01-15 PROBLEM — I38 ENDOCARDITIS: Status: ACTIVE | Noted: 2021-01-15

## 2021-01-15 PROCEDURE — 6360000002 HC RX W HCPCS: Performed by: PHYSICIAN ASSISTANT

## 2021-01-15 PROCEDURE — 6370000000 HC RX 637 (ALT 250 FOR IP): Performed by: PHYSICIAN ASSISTANT

## 2021-01-15 PROCEDURE — 1200000002 HC SEMI PRIVATE SWING BED

## 2021-01-15 PROCEDURE — 2580000003 HC RX 258: Performed by: PHYSICIAN ASSISTANT

## 2021-01-15 RX ORDER — SENNA PLUS 8.6 MG/1
2 TABLET ORAL NIGHTLY PRN
Status: ON HOLD | COMMUNITY
End: 2021-01-25 | Stop reason: HOSPADM

## 2021-01-15 RX ORDER — ACETAMINOPHEN 325 MG/1
650 TABLET ORAL EVERY 4 HOURS PRN
Status: DISCONTINUED | OUTPATIENT
Start: 2021-01-15 | End: 2021-01-25 | Stop reason: HOSPADM

## 2021-01-15 RX ORDER — NICOTINE 21 MG/24HR
1 PATCH, TRANSDERMAL 24 HOURS TRANSDERMAL EVERY 24 HOURS
Status: DISCONTINUED | OUTPATIENT
Start: 2021-01-16 | End: 2021-01-25 | Stop reason: HOSPADM

## 2021-01-15 RX ORDER — PENICILLIN G POTASSIUM 20000000 [IU]/1
INJECTION, POWDER, FOR SOLUTION INTRAVENOUS
Status: DISCONTINUED
Start: 2021-01-15 | End: 2021-01-15 | Stop reason: ALTCHOICE

## 2021-01-15 RX ORDER — SENNOSIDES 8.8 MG/5ML
5 LIQUID ORAL NIGHTLY
Status: ON HOLD | COMMUNITY
End: 2021-01-25 | Stop reason: HOSPADM

## 2021-01-15 RX ORDER — HYDROXYZINE HYDROCHLORIDE 25 MG/1
25 TABLET, FILM COATED ORAL NIGHTLY PRN
Status: ON HOLD | COMMUNITY
End: 2021-01-25 | Stop reason: SDUPTHER

## 2021-01-15 RX ORDER — SIMETHICONE 80 MG
80 TABLET,CHEWABLE ORAL EVERY 4 HOURS PRN
Status: ON HOLD | COMMUNITY
End: 2021-01-25 | Stop reason: HOSPADM

## 2021-01-15 RX ORDER — DOCUSATE SODIUM 100 MG/1
100 CAPSULE, LIQUID FILLED ORAL 2 TIMES DAILY
Status: ON HOLD | COMMUNITY
End: 2021-01-25 | Stop reason: SDUPTHER

## 2021-01-15 RX ORDER — ACYCLOVIR 50 MG/G
OINTMENT TOPICAL EVERY 4 HOURS
Status: DISCONTINUED | OUTPATIENT
Start: 2021-01-15 | End: 2021-01-17

## 2021-01-15 RX ORDER — POLYETHYLENE GLYCOL 3350 17 G/17G
17 POWDER, FOR SOLUTION ORAL DAILY PRN
Status: DISCONTINUED | OUTPATIENT
Start: 2021-01-15 | End: 2021-01-25 | Stop reason: HOSPADM

## 2021-01-15 RX ORDER — BUPRENORPHINE AND NALOXONE 8; 2 MG/1; MG/1
2 FILM, SOLUBLE BUCCAL; SUBLINGUAL DAILY
COMMUNITY

## 2021-01-15 RX ORDER — NICOTINE 21 MG/24HR
1 PATCH, TRANSDERMAL 24 HOURS TRANSDERMAL EVERY 24 HOURS
Status: ON HOLD | COMMUNITY
End: 2021-01-25 | Stop reason: HOSPADM

## 2021-01-15 RX ORDER — FAMOTIDINE 20 MG/1
20 TABLET, FILM COATED ORAL 2 TIMES DAILY
Status: DISCONTINUED | OUTPATIENT
Start: 2021-01-15 | End: 2021-01-25 | Stop reason: HOSPADM

## 2021-01-15 RX ORDER — NALOXONE HYDROCHLORIDE 4 MG/.1ML
1 SPRAY NASAL PRN
COMMUNITY

## 2021-01-15 RX ORDER — SIMETHICONE 80 MG
80 TABLET,CHEWABLE ORAL EVERY 4 HOURS PRN
Status: DISCONTINUED | OUTPATIENT
Start: 2021-01-15 | End: 2021-01-25 | Stop reason: HOSPADM

## 2021-01-15 RX ORDER — ACETAMINOPHEN 325 MG/1
650 TABLET ORAL EVERY 8 HOURS PRN
Status: DISCONTINUED | OUTPATIENT
Start: 2021-01-15 | End: 2021-01-15 | Stop reason: SDUPTHER

## 2021-01-15 RX ORDER — BUPRENORPHINE HYDROCHLORIDE AND NALOXONE HYDROCHLORIDE DIHYDRATE 8; 2 MG/1; MG/1
2 TABLET SUBLINGUAL DAILY
Status: DISCONTINUED | OUTPATIENT
Start: 2021-01-16 | End: 2021-01-25 | Stop reason: HOSPADM

## 2021-01-15 RX ORDER — ACYCLOVIR 50 MG/G
OINTMENT TOPICAL EVERY 4 HOURS
Status: ON HOLD | COMMUNITY
End: 2021-01-25 | Stop reason: HOSPADM

## 2021-01-15 RX ORDER — DOCUSATE SODIUM 100 MG/1
100 CAPSULE, LIQUID FILLED ORAL 2 TIMES DAILY
Status: DISCONTINUED | OUTPATIENT
Start: 2021-01-15 | End: 2021-01-25 | Stop reason: HOSPADM

## 2021-01-15 RX ORDER — MIRTAZAPINE 15 MG/1
15 TABLET, FILM COATED ORAL NIGHTLY
Status: ON HOLD | COMMUNITY
End: 2021-01-25 | Stop reason: SDUPTHER

## 2021-01-15 RX ORDER — NICOTINE 21 MG/24HR
1 PATCH, TRANSDERMAL 24 HOURS TRANSDERMAL EVERY 24 HOURS
Status: DISCONTINUED | OUTPATIENT
Start: 2021-01-15 | End: 2021-01-15

## 2021-01-15 RX ORDER — HYDROXYZINE HYDROCHLORIDE 25 MG/1
25 TABLET, FILM COATED ORAL 3 TIMES DAILY PRN
Status: DISCONTINUED | OUTPATIENT
Start: 2021-01-15 | End: 2021-01-25 | Stop reason: HOSPADM

## 2021-01-15 RX ORDER — ACETAMINOPHEN 325 MG/1
650 TABLET ORAL EVERY 6 HOURS PRN
COMMUNITY

## 2021-01-15 RX ORDER — MIRTAZAPINE 15 MG/1
15 TABLET, FILM COATED ORAL NIGHTLY
Status: DISCONTINUED | OUTPATIENT
Start: 2021-01-15 | End: 2021-01-25 | Stop reason: HOSPADM

## 2021-01-15 RX ADMIN — DEXTROSE MONOHYDRATE: 50 INJECTION, SOLUTION INTRAVENOUS at 20:46

## 2021-01-15 RX ADMIN — HYDROXYZINE HYDROCHLORIDE 25 MG: 25 TABLET, FILM COATED ORAL at 20:43

## 2021-01-15 RX ADMIN — DOCUSATE SODIUM 100 MG: 100 CAPSULE, LIQUID FILLED ORAL at 20:42

## 2021-01-15 RX ADMIN — METOPROLOL TARTRATE 12.5 MG: 25 TABLET, FILM COATED ORAL at 20:43

## 2021-01-15 RX ADMIN — MIRTAZAPINE 15 MG: 15 TABLET, FILM COATED ORAL at 20:43

## 2021-01-15 RX ADMIN — ACETAMINOPHEN 325 MG: 325 TABLET, FILM COATED ORAL at 20:42

## 2021-01-15 NOTE — FLOWSHEET NOTE
01/15/21 1823   Assessment   Charting Type Admission   Neurological   Neuro (WDL) WDL   [place] Coma Scale   Eye Opening 4   Best Verbal Response 5   Best Motor Response 6   [place] Coma Scale Score 15   HEENT   HEENT (WDL) WDL   Respiratory   Respiratory (WDL) X   Respiratory Pattern Regular   Respiratory Depth Normal   Respiratory Quality/Effort Unlabored   Chest Assessment Chest expansion symmetrical   L Breath Sounds Clear   R Breath Sounds Clear   Cardiac   Cardiac (WDL) X   Cardiac Regularity Regular   Cardiac Rhythm ST   Cardiac Monitor   Bedside Cardiac Monitor On No   Gastrointestinal   Abdominal (WDL) X   Last BM (including prior to admit) 01/14/21   RUQ Bowel Sounds Active   LUQ Bowel Sounds Active   RLQ Bowel Sounds Active   LLQ Bowel Sounds Active   Peripheral Vascular   Peripheral Vascular (WDL) X   Edema Right lower extremity; Left lower extremity   RLE Edema +1;Non-pitting   LLE Edema +2;Non-pitting   Skin Color/Condition   Skin Color/Condition (WDL) X   Skin Color Pale   Skin Condition/Temp Dry; Warm   Skin Integrity   Skin Integrity (WDL) WDL   Musculoskeletal   Musculoskeletal (WDL) X   RL Extremity Weakness   LL Extremity Weakness   Genitourinary   Genitourinary (WDL) WDL   Psychosocial   Psychosocial (WDL) WDL

## 2021-01-15 NOTE — PLAN OF CARE
Problem: Falls - Risk of:  Goal: Will remain free from falls  Description: Will remain free from falls  Outcome: Ongoing  Goal: Absence of physical injury  Description: Absence of physical injury  Outcome: Ongoing     Problem: Falls - Risk of:  Goal: Will remain free from falls  Description: Will remain free from falls  Outcome: Ongoing  Goal: Absence of physical injury  Description: Absence of physical injury  Outcome: Ongoing     Problem: Coping:  Goal: Ability to verbalize feelings about condition will improve  Description: Ability to verbalize feelings about condition will improve  Outcome: Met This Shift     Problem: Coping:  Goal: Ability to verbalize feelings about condition will improve  Description: Ability to verbalize feelings about condition will improve  Outcome: Met This Shift

## 2021-01-16 ENCOUNTER — APPOINTMENT (OUTPATIENT)
Dept: GENERAL RADIOLOGY | Facility: HOSPITAL | Age: 26
DRG: 288 | End: 2021-01-16
Attending: INTERNAL MEDICINE
Payer: MEDICAID

## 2021-01-16 PROBLEM — D64.9 ANEMIA: Status: ACTIVE | Noted: 2021-01-16

## 2021-01-16 PROBLEM — B17.10 ACUTE HEPATITIS C VIRUS INFECTION WITHOUT HEPATIC COMA: Status: ACTIVE | Noted: 2021-01-16

## 2021-01-16 PROBLEM — F11.20 OPIOID DEPENDENCE (HCC): Status: ACTIVE | Noted: 2021-01-16

## 2021-01-16 PROBLEM — B20 HIV (HUMAN IMMUNODEFICIENCY VIRUS INFECTION) (HCC): Status: ACTIVE | Noted: 2021-01-16

## 2021-01-16 PROBLEM — F19.11 HISTORY OF INTRAVENOUS DRUG ABUSE (HCC): Status: ACTIVE | Noted: 2021-01-16

## 2021-01-16 PROBLEM — I33.0 ACUTE BACTERIAL ENDOCARDITIS: Status: ACTIVE | Noted: 2021-01-16

## 2021-01-16 PROBLEM — I76 SEPTIC EMBOLISM (HCC): Status: ACTIVE | Noted: 2021-01-16

## 2021-01-16 PROBLEM — R78.81 BACTEREMIA: Status: ACTIVE | Noted: 2021-01-16

## 2021-01-16 PROBLEM — R50.9 FEVER: Status: ACTIVE | Noted: 2021-01-16

## 2021-01-16 PROBLEM — K02.9 DENTAL CARIES: Status: ACTIVE | Noted: 2021-01-16

## 2021-01-16 PROBLEM — J18.9 PNEUMONIA: Status: ACTIVE | Noted: 2021-01-16

## 2021-01-16 LAB
A/G RATIO: 0.6 (ref 0.8–2)
ABO/RH: NORMAL
ALBUMIN SERPL-MCNC: 2 G/DL (ref 3.4–4.8)
ALP BLD-CCNC: 108 U/L (ref 25–100)
ALT SERPL-CCNC: 23 U/L (ref 4–36)
AMPHETAMINE SCREEN, URINE: NORMAL
ANION GAP SERPL CALCULATED.3IONS-SCNC: 9 MMOL/L (ref 3–16)
ANTIBODY SCREEN: NORMAL
AST SERPL-CCNC: 32 U/L (ref 8–33)
BARBITURATE SCREEN URINE: NORMAL
BASOPHILS ABSOLUTE: 0 K/UL (ref 0–0.1)
BASOPHILS RELATIVE PERCENT: 0.4 %
BENZODIAZEPINE SCREEN, URINE: NORMAL
BILIRUB SERPL-MCNC: 0.3 MG/DL (ref 0.3–1.2)
BILIRUBIN URINE: NEGATIVE
BLOOD BANK DISPENSE STATUS: NORMAL
BLOOD BANK DISPENSE STATUS: NORMAL
BLOOD BANK PRODUCT CODE: NORMAL
BLOOD BANK PRODUCT CODE: NORMAL
BLOOD, URINE: ABNORMAL
BPU ID: NORMAL
BPU ID: NORMAL
BUN BLDV-MCNC: 8 MG/DL (ref 6–20)
CALCIUM SERPL-MCNC: 7.9 MG/DL (ref 8.5–10.5)
CANNABINOID SCREEN URINE: NORMAL
CHLORIDE BLD-SCNC: 99 MMOL/L (ref 98–107)
CLARITY: CLEAR
CO2: 26 MMOL/L (ref 20–30)
COCAINE METABOLITE SCREEN URINE: NORMAL
COLOR: YELLOW
COMPONENT: NORMAL
COMPONENT: NORMAL
CREAT SERPL-MCNC: 0.7 MG/DL (ref 0.4–1.2)
DONOR TYPE/RH: NORMAL
DONOR TYPE/RH: NORMAL
EOSINOPHILS ABSOLUTE: 0.1 K/UL (ref 0–0.4)
EOSINOPHILS RELATIVE PERCENT: 1.1 %
FERRITIN: 426.1 NG/ML (ref 22–322)
FOLATE: 3.75 NG/ML
GFR AFRICAN AMERICAN: >59
GFR NON-AFRICAN AMERICAN: >59
GLOBULIN: 3.6 G/DL
GLUCOSE BLD-MCNC: 107 MG/DL (ref 74–106)
GLUCOSE URINE: NEGATIVE MG/DL
HCT VFR BLD CALC: 23.9 % (ref 40–54)
HEMOGLOBIN: 6.9 G/DL (ref 13–18)
HOLLISTER NO: NORMAL
HOLLISTER NO: NORMAL
IMMATURE GRANULOCYTES #: 0.1 K/UL
IMMATURE GRANULOCYTES %: 1.6 % (ref 0–5)
IRON SATURATION: 9 % (ref 20–50)
IRON: 18 UG/DL (ref 59–158)
KETONES, URINE: NEGATIVE MG/DL
LEUKOCYTE ESTERASE, URINE: NEGATIVE
LYMPHOCYTES ABSOLUTE: 1.9 K/UL (ref 1.5–4)
LYMPHOCYTES RELATIVE PERCENT: 22.9 %
Lab: NORMAL
MCH RBC QN AUTO: 24.6 PG (ref 27–32)
MCHC RBC AUTO-ENTMCNC: 28.9 G/DL (ref 31–35)
MCV RBC AUTO: 85.4 FL (ref 80–100)
METHADONE SCREEN, URINE: NORMAL
METHAMPHETAMINE, URINE: NORMAL
MICROSCOPIC EXAMINATION: YES
MONOCYTES ABSOLUTE: 0.7 K/UL (ref 0.2–0.8)
MONOCYTES RELATIVE PERCENT: 8.4 %
NEUTROPHILS ABSOLUTE: 5.4 K/UL (ref 2–7.5)
NEUTROPHILS RELATIVE PERCENT: 65.6 %
NITRITE, URINE: NEGATIVE
OPIATE SCREEN URINE: NORMAL
PDW BLD-RTO: 16.3 % (ref 11–16)
PH UA: 6.5 (ref 5–8)
PHENCYCLIDINE SCREEN URINE: NORMAL
PLATELET # BLD: 404 K/UL (ref 150–400)
PMV BLD AUTO: 9.8 FL (ref 6–10)
POTASSIUM REFLEX MAGNESIUM: 4.2 MMOL/L (ref 3.4–5.1)
PROPOXYPHENE SCREEN, URINE: NORMAL
PROTEIN UA: NEGATIVE MG/DL
RBC # BLD: 2.8 M/UL (ref 4.5–6)
RBC UA: ABNORMAL /HPF (ref 0–4)
SODIUM BLD-SCNC: 134 MMOL/L (ref 136–145)
SPECIFIC GRAVITY UA: 1.01 (ref 1–1.03)
TOTAL IRON BINDING CAPACITY: 207 UG/DL (ref 250–450)
TOTAL PROTEIN: 5.6 G/DL (ref 6.4–8.3)
TRICYCLIC, URINE: NORMAL
UR OXYCODONE RAPID SCREEN: NORMAL
URINE REFLEX TO CULTURE: ABNORMAL
URINE TYPE: ABNORMAL
UROBILINOGEN, URINE: 4 E.U./DL
VITAMIN B-12: 373 PG/ML (ref 211–911)
WBC # BLD: 8.3 K/UL (ref 4–11)
WBC UA: ABNORMAL /HPF (ref 0–5)

## 2021-01-16 PROCEDURE — 81001 URINALYSIS AUTO W/SCOPE: CPT

## 2021-01-16 PROCEDURE — 87040 BLOOD CULTURE FOR BACTERIA: CPT

## 2021-01-16 PROCEDURE — 93005 ELECTROCARDIOGRAM TRACING: CPT

## 2021-01-16 PROCEDURE — 86900 BLOOD TYPING SEROLOGIC ABO: CPT

## 2021-01-16 PROCEDURE — 82728 ASSAY OF FERRITIN: CPT

## 2021-01-16 PROCEDURE — 86920 COMPATIBILITY TEST SPIN: CPT

## 2021-01-16 PROCEDURE — 99223 1ST HOSP IP/OBS HIGH 75: CPT | Performed by: INTERNAL MEDICINE

## 2021-01-16 PROCEDURE — 6370000000 HC RX 637 (ALT 250 FOR IP): Performed by: PHYSICIAN ASSISTANT

## 2021-01-16 PROCEDURE — 6360000002 HC RX W HCPCS: Performed by: INTERNAL MEDICINE

## 2021-01-16 PROCEDURE — 2580000003 HC RX 258: Performed by: PHYSICIAN ASSISTANT

## 2021-01-16 PROCEDURE — 83540 ASSAY OF IRON: CPT

## 2021-01-16 PROCEDURE — 36415 COLL VENOUS BLD VENIPUNCTURE: CPT

## 2021-01-16 PROCEDURE — 71046 X-RAY EXAM CHEST 2 VIEWS: CPT

## 2021-01-16 PROCEDURE — 2580000003 HC RX 258: Performed by: INTERNAL MEDICINE

## 2021-01-16 PROCEDURE — 86480 TB TEST CELL IMMUN MEASURE: CPT

## 2021-01-16 PROCEDURE — 83550 IRON BINDING TEST: CPT

## 2021-01-16 PROCEDURE — 1200000002 HC SEMI PRIVATE SWING BED

## 2021-01-16 PROCEDURE — P9016 RBC LEUKOCYTES REDUCED: HCPCS

## 2021-01-16 PROCEDURE — 6360000002 HC RX W HCPCS: Performed by: PHYSICIAN ASSISTANT

## 2021-01-16 PROCEDURE — 80307 DRUG TEST PRSMV CHEM ANLYZR: CPT

## 2021-01-16 PROCEDURE — 85025 COMPLETE CBC W/AUTO DIFF WBC: CPT

## 2021-01-16 PROCEDURE — 36430 TRANSFUSION BLD/BLD COMPNT: CPT

## 2021-01-16 PROCEDURE — 80053 COMPREHEN METABOLIC PANEL: CPT

## 2021-01-16 PROCEDURE — 82607 VITAMIN B-12: CPT

## 2021-01-16 PROCEDURE — 86850 RBC ANTIBODY SCREEN: CPT

## 2021-01-16 PROCEDURE — 86901 BLOOD TYPING SEROLOGIC RH(D): CPT

## 2021-01-16 PROCEDURE — 82746 ASSAY OF FOLIC ACID SERUM: CPT

## 2021-01-16 RX ORDER — PROMETHAZINE HYDROCHLORIDE 25 MG/1
12.5 TABLET ORAL EVERY 6 HOURS PRN
Status: DISCONTINUED | OUTPATIENT
Start: 2021-01-16 | End: 2021-01-25 | Stop reason: HOSPADM

## 2021-01-16 RX ORDER — DIPHENHYDRAMINE HCL 25 MG
25 CAPSULE ORAL ONCE
Status: COMPLETED | OUTPATIENT
Start: 2021-01-16 | End: 2021-01-16

## 2021-01-16 RX ORDER — FOLIC ACID 1 MG/1
1 TABLET ORAL DAILY
Status: DISCONTINUED | OUTPATIENT
Start: 2021-01-16 | End: 2021-01-25 | Stop reason: HOSPADM

## 2021-01-16 RX ORDER — PENICILLIN G POTASSIUM 20000000 [IU]/1
INJECTION, POWDER, FOR SOLUTION INTRAVENOUS
Status: DISPENSED
Start: 2021-01-16 | End: 2021-01-17

## 2021-01-16 RX ORDER — FERROUS SULFATE TAB EC 324 MG (65 MG FE EQUIVALENT) 324 (65 FE) MG
324 TABLET DELAYED RESPONSE ORAL 2 TIMES DAILY WITH MEALS
Status: DISCONTINUED | OUTPATIENT
Start: 2021-01-19 | End: 2021-01-25 | Stop reason: HOSPADM

## 2021-01-16 RX ORDER — SODIUM CHLORIDE 9 MG/ML
INJECTION, SOLUTION INTRAVENOUS PRN
Status: DISCONTINUED | OUTPATIENT
Start: 2021-01-16 | End: 2021-01-25 | Stop reason: HOSPADM

## 2021-01-16 RX ORDER — DOXYCYCLINE 100 MG/1
100 CAPSULE ORAL EVERY 12 HOURS SCHEDULED
Status: COMPLETED | OUTPATIENT
Start: 2021-01-16 | End: 2021-01-23

## 2021-01-16 RX ADMIN — METOPROLOL TARTRATE 12.5 MG: 25 TABLET, FILM COATED ORAL at 20:29

## 2021-01-16 RX ADMIN — VANCOMYCIN HYDROCHLORIDE 750 MG: 750 INJECTION, POWDER, LYOPHILIZED, FOR SOLUTION INTRAVENOUS at 21:58

## 2021-01-16 RX ADMIN — DIPHENHYDRAMINE HYDROCHLORIDE 25 MG: 25 CAPSULE ORAL at 20:29

## 2021-01-16 RX ADMIN — HYDROXYZINE HYDROCHLORIDE 25 MG: 25 TABLET, FILM COATED ORAL at 17:08

## 2021-01-16 RX ADMIN — DOCUSATE SODIUM 100 MG: 100 CAPSULE, LIQUID FILLED ORAL at 08:45

## 2021-01-16 RX ADMIN — BUPRENORPHINE AND NALOXONE 2 TABLET: 8; 2 TABLET SUBLINGUAL at 08:45

## 2021-01-16 RX ADMIN — VANCOMYCIN HYDROCHLORIDE 750 MG: 750 INJECTION, POWDER, LYOPHILIZED, FOR SOLUTION INTRAVENOUS at 17:03

## 2021-01-16 RX ADMIN — FOLIC ACID 1 MG: 1 TABLET ORAL at 13:00

## 2021-01-16 RX ADMIN — FAMOTIDINE 20 MG: 20 TABLET, FILM COATED ORAL at 08:45

## 2021-01-16 RX ADMIN — DOXYCYCLINE 100 MG: 100 CAPSULE ORAL at 20:28

## 2021-01-16 RX ADMIN — MIRTAZAPINE 15 MG: 15 TABLET, FILM COATED ORAL at 20:29

## 2021-01-16 RX ADMIN — ACETAMINOPHEN 650 MG: 325 TABLET, FILM COATED ORAL at 20:28

## 2021-01-16 RX ADMIN — FAMOTIDINE 20 MG: 20 TABLET, FILM COATED ORAL at 20:28

## 2021-01-16 RX ADMIN — METOPROLOL TARTRATE 12.5 MG: 25 TABLET, FILM COATED ORAL at 08:45

## 2021-01-16 RX ADMIN — ENOXAPARIN SODIUM 40 MG: 40 INJECTION SUBCUTANEOUS at 08:46

## 2021-01-16 RX ADMIN — DEXTROSE MONOHYDRATE: 50 INJECTION, SOLUTION INTRAVENOUS at 23:00

## 2021-01-16 RX ADMIN — IRON SUCROSE 200 MG: 20 INJECTION, SOLUTION INTRAVENOUS at 12:46

## 2021-01-16 ASSESSMENT — PAIN SCALES - GENERAL: PAINLEVEL_OUTOF10: 4

## 2021-01-16 NOTE — FLOWSHEET NOTE
01/16/21 0943   Assessment   Charting Type Shift assessment   Neurological   Neuro (WDL) WDL   Swallow Screening   Is the patient able to remain alert for testing? Yes   [place] Coma Scale   Eye Opening 4   Best Verbal Response 5   Best Motor Response 6   [place] Coma Scale Score 15   HEENT   HEENT (WDL) WDL   Respiratory   Respiratory (WDL) X   Respiratory Pattern Regular   Respiratory Depth Normal   Respiratory Quality/Effort Unlabored   Chest Assessment Chest expansion symmetrical   L Breath Sounds Clear   R Breath Sounds Clear   Cardiac   Cardiac (WDL) X   Cardiac Regularity Regular   Cardiac Rhythm ST   Cardiac Monitor   Bedside Cardiac Monitor On No   Telemetry Monitor On No   Gastrointestinal   Abdominal (WDL) WDL   Abdomen Inspection Flat   RUQ Bowel Sounds Active   LUQ Bowel Sounds Active   RLQ Bowel Sounds Active   LLQ Bowel Sounds Active   Peripheral Vascular   Peripheral Vascular (WDL) X   Edema Right lower extremity; Left lower extremity   RLE Edema Non-pitting   LLE Edema Non-pitting   Skin Color/Condition   Skin Color/Condition (WDL) X   Skin Color Pale   Skin Condition/Temp Dry; Warm   Skin Integrity   Skin Integrity (WDL) WDL   Musculoskeletal   Musculoskeletal (WDL) X   RL Extremity Weakness   LL Extremity Weakness   Genitourinary   Genitourinary (WDL) WDL   Psychosocial   Psychosocial (WDL) WDL

## 2021-01-16 NOTE — PLAN OF CARE
Problem: Falls - Risk of:  Goal: Will remain free from falls  Description: Will remain free from falls  Outcome: Ongoing     Problem:  Activity:  Goal: Capacity to carry out activities will improve, regarding daily activities  Description: Capacity to carry out activities will improve, regarding daily activities  Outcome: Ongoing     Problem: Respiratory:  Goal: Ability to maintain adequate ventilation will improve  Description: Ability to maintain adequate ventilation will improve  Outcome: Ongoing

## 2021-01-16 NOTE — H&P
History and Physical    Patient:  Jim Hyman    CHIEF COMPLAINT:    TV and AV Endocarditis  Septic Emboli  Streptococcus Gordoni Bacteremia     HISTORY OF PRESENT ILLNESS:   The patient is a 31 y.o. male with past medical history of IV drug abuse, opioid dependence, stimulant abuse, hepatitis C, HIV, aortic valve and tricuspid valve endocarditis, septic emboli, bacteremia who presents to this facility from the [address] [place] [address] [facility] for ongoing IV penicillin G until 1/25/2021 and a closely monitored setting. Patient was managed by [facility] surgery from 12/28-1/15. Underwent angio back with Dr. Vaisliy Jackson on 1/7. Per infectious disease it was recommended he continue penicillin G from 12/28-1/25. Per discharge summary patient did have A-V dissociation post procedure which lasted about 36 hours but then converted back to sinus tachycardia with first-degree AV block. Fevers had resolved per discharge summary. Plan for ongoing IV antibiotics and to follow-up with Dr. Oscar See for repeat echo and possible further surgical intervention. On arrival to this facility patient with low-grade fever 100.8. He denies cough or SOA. Denies dysuria. States he knows his heartrate is really high and he is tired. Admits to dyspnea on exertion. No other complaints. Hemoglobin 6.9. Past Medical History:      Diagnosis Date    Endocarditis        Past Surgical History:      Procedure Laterality Date    OTHER SURGICAL HISTORY      angiovac    WISDOM TOOTH EXTRACTION         Medications Prior to Admission:    Prior to Admission medications    Medication Sig Start Date End Date Taking? Authorizing Provider   bictegravir-emtricitab-tenofovir alafenamide (BIKTARVY) -25 MG TABS per tablet Take 1 tablet by mouth daily   Yes Historical Provider, MD   buprenorphine-naloxone (SUBOXONE) 8-2 MG FILM SL film Place 2 Film under the tongue daily.    Yes Historical Provider, MD   acetaminophen (TYLENOL) 325 MG tablet Take 650 mg by  (H) 01/16/2021       XR CHEST (2 VW)   Final Result      Patchy bibasilar opacities and small pleural effusions, greater on the right, possibly infectious. Assessment and Plan     Active Hospital Problems    Diagnosis Date Noted    History of intravenous drug abuse ([facility] [address] [F19.11]  - History of opioid abuse   - currently on suboxone  - encourage ongoing cessation  - Consult  Charles Sosa when available  01/16/2021    HIV (human immunodeficiency virus infection) ([facility] [address] [B20]  - New diagnosis for this patient   - Per [facility] chart review last CD4 count > 400 (622 on 12/28)  - Follow up with ID as scheduled as outpatient  01/16/2021    Fever [R50.9]  - on arrival to this facility patient with low grade fever 99.5-100.8   - confirmed no PICC line in place   - on PCN G as below in setting of endocarditis, bacteremia, septic emboli   - Did call and discuss the case with CT surgery team at [facility] Dr. Shawna Avery 1/16 and he recommends blood cultures but otherwise somewhat expects low grade fever in this patient with TV involvement and septic emboli. He had temp 100.9 at [facility] over last few days prior to transfer to this facility.   - Obtained CXR and UA as well as blood cultures x 2   - CXR with evidence of possible pneumonia - did review recent CXRs at outside facility including most recent 1/11/21 which shows bibasilar atelectasis and small bilateral pleural effusions   - Continue PCN G.  Added Vancomycin and PO doxycycline with suspected pneumonia and ongoing fever.   - Follow blood cultures closely   - Did review last ID note from [facility] - ID signed off on this case on 12/31/20 with recommendations for 4 weeks PCN G 01/16/2021    Acute bacterial endocarditis [I33.0]  - ECHO with TV and AV vegetations  - Blood cultures streptococcus gordoni 12/24 and repeat cultures 12/28 no growt  - CT/CTA head, CTA neck no acute findings  - CT chest multiple septic pulmonary emboli  - MRI chest without evidence of osteomyelitis 12/31    - Managed by [facility] Cardiothoracic surgery team from 12/28 to 1/15  - Per infectious disease recommendations he was started on penicillin G 12/28    - due to persistent fevers he underwent angiovac debulking of tricuspid valve with Dr. Dai Alcala on 1/7   - Continue IV PCN G until 1/25/21 and follow up with Dr. Luna Gonzalez 3/3/21 @ 11:30 AM (Will have echo first at 11:30 then see Dr. Luna Gnozalez)   - Follow up with RAND Eaton APRN 2/10/21 @ 2pm  - CBC with diff, CMP, CRP weekly on mondays (1/11 .5)  01/16/2021    Septic embolism (H[facility]) [facility]  - see above   - currently on room air 01/16/2021    hepatitis C virus infection without hepatic coma [B17.10]  - follow up as outpatient  - Discussed with Drea Vidales PharmD and with HIV and Hep C infections will not be appropriate for our Hep C clinic and will need to follow up at [facility]  01/16/2021    Bacteremia [R78.81]  - Blood cx + streptococcus gordoni 12/24 and clear since 12/28  - Repeat blood cx pending with persistent fever   - antibiotic regimen adjusted as above and will continue to adjust as indicated  01/16/2021    Anemia [D64.9]  - Hgb 6.9 this AM (7.7 at [facility] 9/28)  - Iron and folic acid deficient  - Transfuse 2 units PRBCs. Venofer infusion x 3 ordered followed by po iron supplementation. Start daily folic acid. - on gi ppx  - no evidence of bleeding  - monitor closely 01/16/2021    Opioid dependence ([facility] [address] [F11.20]  - continue suboxone and follow up with [facility] addiction medicine as scheduled ( to confirm appointment)  01/16/2021    Pneumonia [J18.9]  - with fever did obtain CXR, UA, blood cx x 2 as above  - CXR with evidence of possible pneumonia  - continue PCN G. Added Vancomycin and doxycycline today.  If blood cultures negative plan to DC vancomycin.   - on room air at this time   - encourage pulmonary toileting  - monitor for improvement  01/16/2021   · Dental caries  - Panorex with subtle lucency in tooth #2 which could represent dental caries. Follow up with dentistry as outpateint. Patient was seen and examined by Dr. Binu Josue and plan of care reviewed.       Electronically signed by EVERARDO Miramontes on 1/16/2021 at 1:38 PM

## 2021-01-17 LAB
ANION GAP SERPL CALCULATED.3IONS-SCNC: 8 MMOL/L (ref 3–16)
BUN BLDV-MCNC: 7 MG/DL (ref 6–20)
CALCIUM SERPL-MCNC: 8.5 MG/DL (ref 8.5–10.5)
CHLORIDE BLD-SCNC: 101 MMOL/L (ref 98–107)
CO2: 27 MMOL/L (ref 20–30)
CREAT SERPL-MCNC: 0.6 MG/DL (ref 0.4–1.2)
GFR AFRICAN AMERICAN: >59
GFR NON-AFRICAN AMERICAN: >59
GLUCOSE BLD-MCNC: 92 MG/DL (ref 74–106)
HCT VFR BLD CALC: 27.9 % (ref 40–54)
HEMOGLOBIN: 8.3 G/DL (ref 13–18)
MCH RBC QN AUTO: 25.6 PG (ref 27–32)
MCHC RBC AUTO-ENTMCNC: 29.7 G/DL (ref 31–35)
MCV RBC AUTO: 86.1 FL (ref 80–100)
PDW BLD-RTO: 16.7 % (ref 11–16)
PLATELET # BLD: 465 K/UL (ref 150–400)
PMV BLD AUTO: 10.2 FL (ref 6–10)
POTASSIUM REFLEX MAGNESIUM: 4 MMOL/L (ref 3.4–5.1)
RBC # BLD: 3.24 M/UL (ref 4.5–6)
SODIUM BLD-SCNC: 136 MMOL/L (ref 136–145)
WBC # BLD: 8.3 K/UL (ref 4–11)

## 2021-01-17 PROCEDURE — 36415 COLL VENOUS BLD VENIPUNCTURE: CPT

## 2021-01-17 PROCEDURE — 6370000000 HC RX 637 (ALT 250 FOR IP): Performed by: PHYSICIAN ASSISTANT

## 2021-01-17 PROCEDURE — 2580000003 HC RX 258: Performed by: INTERNAL MEDICINE

## 2021-01-17 PROCEDURE — 6360000002 HC RX W HCPCS: Performed by: PHYSICIAN ASSISTANT

## 2021-01-17 PROCEDURE — 2580000003 HC RX 258: Performed by: PHYSICIAN ASSISTANT

## 2021-01-17 PROCEDURE — 1200000002 HC SEMI PRIVATE SWING BED

## 2021-01-17 PROCEDURE — 6360000002 HC RX W HCPCS: Performed by: INTERNAL MEDICINE

## 2021-01-17 PROCEDURE — 80048 BASIC METABOLIC PNL TOTAL CA: CPT

## 2021-01-17 PROCEDURE — 85027 COMPLETE CBC AUTOMATED: CPT

## 2021-01-17 PROCEDURE — 6360000002 HC RX W HCPCS

## 2021-01-17 RX ORDER — PENICILLIN G POTASSIUM 20000000 [IU]/1
INJECTION, POWDER, FOR SOLUTION INTRAVENOUS
Status: DISCONTINUED
Start: 2021-01-17 | End: 2021-01-18 | Stop reason: ALTCHOICE

## 2021-01-17 RX ORDER — LANOLIN ALCOHOL/MO/W.PET/CERES
3 CREAM (GRAM) TOPICAL NIGHTLY PRN
Status: DISCONTINUED | OUTPATIENT
Start: 2021-01-17 | End: 2021-01-25 | Stop reason: HOSPADM

## 2021-01-17 RX ADMIN — DOXYCYCLINE 100 MG: 100 CAPSULE ORAL at 08:46

## 2021-01-17 RX ADMIN — MELATONIN 3 MG: at 20:25

## 2021-01-17 RX ADMIN — VANCOMYCIN HYDROCHLORIDE 750 MG: 750 INJECTION, POWDER, LYOPHILIZED, FOR SOLUTION INTRAVENOUS at 05:57

## 2021-01-17 RX ADMIN — HYDROXYZINE HYDROCHLORIDE 25 MG: 25 TABLET, FILM COATED ORAL at 08:51

## 2021-01-17 RX ADMIN — METOPROLOL TARTRATE 12.5 MG: 25 TABLET, FILM COATED ORAL at 20:25

## 2021-01-17 RX ADMIN — VANCOMYCIN HYDROCHLORIDE 750 MG: 750 INJECTION, POWDER, LYOPHILIZED, FOR SOLUTION INTRAVENOUS at 15:30

## 2021-01-17 RX ADMIN — FOLIC ACID 1 MG: 1 TABLET ORAL at 08:46

## 2021-01-17 RX ADMIN — DOCUSATE SODIUM 100 MG: 100 CAPSULE, LIQUID FILLED ORAL at 08:46

## 2021-01-17 RX ADMIN — BUPRENORPHINE AND NALOXONE 2 TABLET: 8; 2 TABLET SUBLINGUAL at 08:46

## 2021-01-17 RX ADMIN — MIRTAZAPINE 15 MG: 15 TABLET, FILM COATED ORAL at 20:26

## 2021-01-17 RX ADMIN — HYDROXYZINE HYDROCHLORIDE 25 MG: 25 TABLET, FILM COATED ORAL at 00:07

## 2021-01-17 RX ADMIN — HYDROXYZINE HYDROCHLORIDE 25 MG: 25 TABLET, FILM COATED ORAL at 20:26

## 2021-01-17 RX ADMIN — VANCOMYCIN HYDROCHLORIDE 750 MG: 750 INJECTION, POWDER, LYOPHILIZED, FOR SOLUTION INTRAVENOUS at 20:26

## 2021-01-17 RX ADMIN — DOXYCYCLINE 100 MG: 100 CAPSULE ORAL at 20:25

## 2021-01-17 RX ADMIN — ENOXAPARIN SODIUM 40 MG: 40 INJECTION SUBCUTANEOUS at 08:47

## 2021-01-17 RX ADMIN — FAMOTIDINE 20 MG: 20 TABLET, FILM COATED ORAL at 20:26

## 2021-01-17 RX ADMIN — FAMOTIDINE 20 MG: 20 TABLET, FILM COATED ORAL at 08:46

## 2021-01-17 RX ADMIN — DOCUSATE SODIUM 100 MG: 100 CAPSULE, LIQUID FILLED ORAL at 20:25

## 2021-01-17 RX ADMIN — METOPROLOL TARTRATE 12.5 MG: 25 TABLET, FILM COATED ORAL at 08:46

## 2021-01-17 RX ADMIN — IRON SUCROSE 200 MG: 20 INJECTION, SOLUTION INTRAVENOUS at 12:02

## 2021-01-17 NOTE — PROGRESS NOTES
2122:  Dr. Matt Fuentes notified regarding patient's potential transfusion reaction. Blood was administered at 2102 and stopped at 2102. Pt reported feeling very anxious as the blood was moving down the tube, but the blood had not reached the patient at this point. Once blood was to the patient he immediately stated that he felt like he couldn't breathe and he asked that I stop the transfusion. Transfusion was stopped and patient's vitals taken. Vitals reported to MD    01/16/21 2102   Vitals   /61   Temp 99.5 °F (37.5 °C)   Temp Source Oral   Pulse 149   Resp 22   SpO2 100 %     EKG performed, showed ST with occasional PVCs, . No new orders given at this time. MD notified that patient is refusing blood at this time. PT states, \"I do not want anymore blood because I am afraid of what will happen. \" Pt educated that his hemoglobin is low and he needs blood, risks and benefits again reviewed with pt. Pt states he does not want anymore blood and stated, \"I will only do the iron infusions. .that should work. \" PT reeducated, but refuses. MD updated. Will monitor patient.

## 2021-01-17 NOTE — FLOWSHEET NOTE
01/17/21 0857   Assessment   Charting Type Shift assessment   Neurological   Neuro (WDL) WDL   HEENT   HEENT (WDL) WDL   Respiratory   Respiratory (WDL) X   Respiratory Pattern Regular   Respiratory Depth Normal   Respiratory Quality/Effort Unlabored   Chest Assessment Chest expansion symmetrical   L Breath Sounds Clear   R Breath Sounds Clear   Cardiac   Cardiac (WDL) X   Cardiac Regularity Regular   Cardiac Rhythm ST   Cardiac Monitor   Bedside Cardiac Monitor On No   Telemetry Monitor On No   Gastrointestinal   Abdominal (WDL) WDL   Abdomen Inspection Flat   RUQ Bowel Sounds Active   LUQ Bowel Sounds Active   RLQ Bowel Sounds Active   LLQ Bowel Sounds Active   Peripheral Vascular   Peripheral Vascular (WDL) X   Edema Right lower extremity; Left lower extremity   RLE Edema Non-pitting   LLE Edema Non-pitting   Skin Color/Condition   Skin Color/Condition (WDL) X   Skin Integrity   Skin Integrity (WDL) WDL   Musculoskeletal   Musculoskeletal (WDL) X   RL Extremity Weakness   LL Extremity Weakness   Genitourinary   Genitourinary (WDL) WDL   Flank Tenderness No   Suprapubic Tenderness No   Dysuria No   Psychosocial   Psychosocial (WDL) X   Patient Behaviors Anxious

## 2021-01-17 NOTE — PROGRESS NOTES
MD informed of temp of 100.2. No new orders. MD also informed that pt has given blood transfusion some thought and states, \"I may take the blood if my mother can come sit with me, but she wont be here until twelve tomorrow. \" I informed pt that his mother could come sit with him now and we could start the transfusion but pt refuses at this time. MD instructed this RN to inform pt that if am labs reveal hgb is less than 7 then he will need the blood and if hgb >7 then we can hold off for now.

## 2021-01-17 NOTE — PLAN OF CARE
Problem: Falls - Risk of:  Goal: Will remain free from falls  Description: Will remain free from falls  1/17/2021 0856 by Carmina Henriquez RN  Outcome: Ongoing  1/17/2021 0053 by Gladys Krishnamurthy RN  Outcome: Ongoing  Goal: Absence of physical injury  Description: Absence of physical injury  1/17/2021 0053 by Gladys Krishnamurthy RN  Outcome: Ongoing     Problem: Coping:  Goal: Ability to verbalize feelings about condition will improve  Description: Ability to verbalize feelings about condition will improve  Outcome: Ongoing

## 2021-01-18 PROBLEM — E46 MALNUTRITION (HCC): Status: ACTIVE | Noted: 2021-01-18

## 2021-01-18 LAB
A/G RATIO: 0.7 (ref 0.8–2)
ALBUMIN SERPL-MCNC: 2.1 G/DL (ref 3.4–4.8)
ALP BLD-CCNC: 93 U/L (ref 25–100)
ALT SERPL-CCNC: 24 U/L (ref 4–36)
ANION GAP SERPL CALCULATED.3IONS-SCNC: 8 MMOL/L (ref 3–16)
AST SERPL-CCNC: 30 U/L (ref 8–33)
BASOPHILS ABSOLUTE: 0 K/UL (ref 0–0.1)
BASOPHILS RELATIVE PERCENT: 0.4 %
BILIRUB SERPL-MCNC: <0.2 MG/DL (ref 0.3–1.2)
BUN BLDV-MCNC: 7 MG/DL (ref 6–20)
C-REACTIVE PROTEIN: 68 MG/L (ref 0–5.1)
CALCIUM SERPL-MCNC: 8 MG/DL (ref 8.5–10.5)
CHLORIDE BLD-SCNC: 103 MMOL/L (ref 98–107)
CO2: 26 MMOL/L (ref 20–30)
CREAT SERPL-MCNC: 0.6 MG/DL (ref 0.4–1.2)
EOSINOPHILS ABSOLUTE: 0.2 K/UL (ref 0–0.4)
EOSINOPHILS RELATIVE PERCENT: 2.3 %
GFR AFRICAN AMERICAN: >59
GFR NON-AFRICAN AMERICAN: >59
GLOBULIN: 3 G/DL
GLUCOSE BLD-MCNC: 159 MG/DL (ref 74–106)
HCT VFR BLD CALC: 24.5 % (ref 40–54)
HEMOGLOBIN: 7.2 G/DL (ref 13–18)
IMMATURE GRANULOCYTES #: 0.1 K/UL
IMMATURE GRANULOCYTES %: 1 % (ref 0–5)
LYMPHOCYTES ABSOLUTE: 1.6 K/UL (ref 1.5–4)
LYMPHOCYTES RELATIVE PERCENT: 22.3 %
MCH RBC QN AUTO: 24.7 PG (ref 27–32)
MCHC RBC AUTO-ENTMCNC: 29.4 G/DL (ref 31–35)
MCV RBC AUTO: 84.2 FL (ref 80–100)
MONOCYTES ABSOLUTE: 0.5 K/UL (ref 0.2–0.8)
MONOCYTES RELATIVE PERCENT: 7.2 %
NEUTROPHILS ABSOLUTE: 4.7 K/UL (ref 2–7.5)
NEUTROPHILS RELATIVE PERCENT: 66.8 %
PDW BLD-RTO: 16.9 % (ref 11–16)
PLATELET # BLD: 415 K/UL (ref 150–400)
PMV BLD AUTO: 9.7 FL (ref 6–10)
POTASSIUM SERPL-SCNC: 3.8 MMOL/L (ref 3.4–5.1)
RBC # BLD: 2.91 M/UL (ref 4.5–6)
SODIUM BLD-SCNC: 137 MMOL/L (ref 136–145)
TOTAL PROTEIN: 5.1 G/DL (ref 6.4–8.3)
VANCOMYCIN TROUGH: 7.8 UG/ML (ref 5–15)
WBC # BLD: 7.1 K/UL (ref 4–11)

## 2021-01-18 PROCEDURE — 6360000002 HC RX W HCPCS: Performed by: PHYSICIAN ASSISTANT

## 2021-01-18 PROCEDURE — 80202 ASSAY OF VANCOMYCIN: CPT

## 2021-01-18 PROCEDURE — 86140 C-REACTIVE PROTEIN: CPT

## 2021-01-18 PROCEDURE — 80053 COMPREHEN METABOLIC PANEL: CPT

## 2021-01-18 PROCEDURE — 85025 COMPLETE CBC W/AUTO DIFF WBC: CPT

## 2021-01-18 PROCEDURE — 2580000003 HC RX 258: Performed by: PHYSICIAN ASSISTANT

## 2021-01-18 PROCEDURE — 6370000000 HC RX 637 (ALT 250 FOR IP): Performed by: PHYSICIAN ASSISTANT

## 2021-01-18 PROCEDURE — 97161 PT EVAL LOW COMPLEX 20 MIN: CPT

## 2021-01-18 PROCEDURE — 97165 OT EVAL LOW COMPLEX 30 MIN: CPT

## 2021-01-18 PROCEDURE — 36415 COLL VENOUS BLD VENIPUNCTURE: CPT

## 2021-01-18 PROCEDURE — 1200000002 HC SEMI PRIVATE SWING BED

## 2021-01-18 PROCEDURE — 6360000002 HC RX W HCPCS: Performed by: INTERNAL MEDICINE

## 2021-01-18 PROCEDURE — 2580000003 HC RX 258: Performed by: INTERNAL MEDICINE

## 2021-01-18 PROCEDURE — 97802 MEDICAL NUTRITION INDIV IN: CPT

## 2021-01-18 RX ADMIN — FAMOTIDINE 20 MG: 20 TABLET, FILM COATED ORAL at 08:32

## 2021-01-18 RX ADMIN — IRON SUCROSE 200 MG: 20 INJECTION, SOLUTION INTRAVENOUS at 12:46

## 2021-01-18 RX ADMIN — VANCOMYCIN 1000 MG: 1 INJECTION, SOLUTION INTRAVENOUS at 13:44

## 2021-01-18 RX ADMIN — METOPROLOL TARTRATE 12.5 MG: 25 TABLET, FILM COATED ORAL at 20:36

## 2021-01-18 RX ADMIN — VANCOMYCIN HYDROCHLORIDE 750 MG: 750 INJECTION, POWDER, LYOPHILIZED, FOR SOLUTION INTRAVENOUS at 05:01

## 2021-01-18 RX ADMIN — DOXYCYCLINE 100 MG: 100 CAPSULE ORAL at 20:37

## 2021-01-18 RX ADMIN — DEXTROSE MONOHYDRATE: 50 INJECTION, SOLUTION INTRAVENOUS at 00:28

## 2021-01-18 RX ADMIN — DOCUSATE SODIUM 100 MG: 100 CAPSULE, LIQUID FILLED ORAL at 08:31

## 2021-01-18 RX ADMIN — DOCUSATE SODIUM 100 MG: 100 CAPSULE, LIQUID FILLED ORAL at 20:35

## 2021-01-18 RX ADMIN — HYDROXYZINE HYDROCHLORIDE 25 MG: 25 TABLET, FILM COATED ORAL at 20:36

## 2021-01-18 RX ADMIN — MIRTAZAPINE 15 MG: 15 TABLET, FILM COATED ORAL at 20:36

## 2021-01-18 RX ADMIN — FAMOTIDINE 20 MG: 20 TABLET, FILM COATED ORAL at 20:36

## 2021-01-18 RX ADMIN — HYDROXYZINE HYDROCHLORIDE 25 MG: 25 TABLET, FILM COATED ORAL at 15:39

## 2021-01-18 RX ADMIN — ENOXAPARIN SODIUM 40 MG: 40 INJECTION SUBCUTANEOUS at 08:31

## 2021-01-18 RX ADMIN — ACETAMINOPHEN 650 MG: 325 TABLET, FILM COATED ORAL at 09:30

## 2021-01-18 RX ADMIN — DOXYCYCLINE 100 MG: 100 CAPSULE ORAL at 08:31

## 2021-01-18 RX ADMIN — HYDROXYZINE HYDROCHLORIDE 25 MG: 25 TABLET, FILM COATED ORAL at 04:42

## 2021-01-18 RX ADMIN — BUPRENORPHINE AND NALOXONE 2 TABLET: 8; 2 TABLET SUBLINGUAL at 08:31

## 2021-01-18 RX ADMIN — FOLIC ACID 1 MG: 1 TABLET ORAL at 08:31

## 2021-01-18 ASSESSMENT — PAIN SCALES - GENERAL: PAINLEVEL_OUTOF10: 0

## 2021-01-18 NOTE — PLAN OF CARE
Problem: Falls - Risk of:  Goal: Will remain free from falls  Description: Will remain free from falls  Outcome: Met This Shift  Goal: Absence of physical injury  Description: Absence of physical injury  Outcome: Met This Shift     Problem:  Activity:  Goal: Capacity to carry out activities will improve, regarding daily activities  Description: Capacity to carry out activities will improve, regarding daily activities  Outcome: Met This Shift  Goal: Ability to maintain optimal joint mobility will improve  Description: Ability to maintain optimal joint mobility will improve  Outcome: Met This Shift  Goal: Muscle strength will improve  Description: Muscle strength will improve  1/18/2021 0941 by Barry Hernandez RN  Outcome: Met This Shift  1/18/2021 0002 by Vickey Petit RN  Outcome: Ongoing     Problem: Cardiac:  Goal: Hemodynamic stability will improve  Description: Hemodynamic stability will improve  Outcome: Met This Shift  Goal: Ability to maintain an adequate cardiac output will improve  Description: Ability to maintain an adequate cardiac output will improve  Outcome: Met This Shift  Goal: Signs and symptoms of infection will decrease  Description: Signs and symptoms of infection will decrease  Outcome: Met This Shift  Goal: Will show no signs or symptoms of fluid imbalance  Description: Will show no signs or symptoms of fluid imbalance  Outcome: Met This Shift     Problem: Coping:  Goal: Ability to verbalize feelings about condition will improve  Description: Ability to verbalize feelings about condition will improve  Outcome: Met This Shift  Goal: Verbalizations of decreased anxiety will increase  Description: Verbalizations of decreased anxiety will increase  Outcome: Met This Shift     Problem: Health Behavior:  Goal: Ability to identify changes in lifestyle to reduce recurrence of condition will improve  Description: Ability to identify changes in lifestyle to reduce recurrence of condition will improve  Outcome: Met This Shift  Goal: Ability to manage health-related needs will improve  Description: Ability to manage health-related needs will improve  Outcome: Met This Shift     Problem: Physical Regulation:  Goal: Ability to avoid or minimize complications of infection will improve  Description: Ability to avoid or minimize complications of infection will improve  1/18/2021 0941 by Vinay Stallworth RN  Outcome: Met This Shift  1/18/2021 0002 by Rishabh Rosa RN  Outcome: Ongoing  Goal: Maintenance of adequate nutrition will improve  Description: Maintenance of adequate nutrition will improve  Outcome: Met This Shift     Problem: Respiratory:  Goal: Ability to maintain adequate ventilation will improve  Description: Ability to maintain adequate ventilation will improve  Outcome: Met This Shift     Problem: Role Relationship:  Goal: Ability to express an understanding of role expectations in relation to illness will improve  Description: Ability to express an understanding of role expectations in relation to illness will improve  Outcome: Met This Shift     Problem: Sensory:  Goal: Pain level will decrease  Description: Pain level will decrease  Outcome: Met This Shift     Problem: Infection:  Goal: Will remain free from infection  Description: Will remain free from infection  Outcome: Met This Shift     Problem: Safety:  Goal: Free from accidental physical injury  Description: Free from accidental physical injury  1/18/2021 0941 by Vinay Stallworth RN  Outcome: Met This Shift  1/18/2021 0002 by Rishabh Rosa RN  Outcome: Ongoing  Goal: Free from intentional harm  Description: Free from intentional harm  Outcome: Met This Shift     Problem: Daily Care:  Goal: Daily care needs are met  Description: Daily care needs are met  Outcome: Met This Shift     Problem: Pain:  Goal: Patient's pain/discomfort is manageable  Description: Patient's pain/discomfort is manageable  Outcome: Met This Shift     Problem: Skin Integrity:  Goal: Skin integrity will stabilize  Description: Skin integrity will stabilize  Outcome: Met This Shift     Problem: Discharge Planning:  Goal: Patients continuum of care needs are met  Description: Patients continuum of care needs are met  Outcome: Ongoing

## 2021-01-18 NOTE — FLOWSHEET NOTE
01/18/21 0800   Precautions   Precautions Fall risk   Safe Environment   Arm Bands On ID   Fall Risk Interventions   Toilet Every 2 Hours-In Advance of Need Yes   Hourly Visual Checks In bed;Awake;Quiet   Fall Visual Posted Socks   Alarm On Bed   Mobility   Activity Up as tolerated   Level of Assistance Independent   Repositioned Turns self   Patient Turned Turns self   Head of Bed Elevated  Self regulated   Range of Motion Active; All extremities   Telemetry Details   Telemetry Monitor On No   Verbal   Verbal Impairment None   Preferred language English   Needs  N/A    Used N/A   Nutrition   Feeding Able to feed self   Tolerating PO Fluids Yes   Pain Assessment   Pain Assessment 0-10   Pain Level 0

## 2021-01-18 NOTE — FLOWSHEET NOTE
01/17/21 2025   Assessment   Charting Type Shift assessment   Neurological   Neuro (WDL) WDL   [place] Coma Scale   Eye Opening 4   Best Verbal Response 5   Best Motor Response 6   [place] Coma Scale Score 15   HEENT   HEENT (WDL) WDL   Respiratory   Respiratory (WDL) X   Respiratory Pattern Regular   Respiratory Depth Normal   Respiratory Quality/Effort Unlabored   Chest Assessment Chest expansion symmetrical;Trachea midline   L Breath Sounds Clear   R Breath Sounds Clear   Breath Sounds   Right Upper Lobe Clear   Right Middle Lobe Clear   Right Lower Lobe Clear   Left Upper Lobe Clear   Left Lower Lobe Clear   Cardiac   Cardiac (WDL) X   Cardiac Regularity Regular   Cardiac Monitor   Bedside Cardiac Monitor On No   Telemetry Monitor On No   Gastrointestinal   Abdominal (WDL) WDL   Abdomen Inspection Flat   RUQ Bowel Sounds Active   LUQ Bowel Sounds Active   RLQ Bowel Sounds Active   LLQ Bowel Sounds Active   Peripheral Vascular   Peripheral Vascular (WDL) X   Edema Right lower extremity; Left lower extremity   RLE Edema Non-pitting   LLE Edema Non-pitting   Skin Color/Condition   Skin Color/Condition (WDL) X   Skin Color Appropriate for ethnicity   Skin Condition/Temp Warm;Dry   Skin Integrity   Skin Integrity (WDL) WDL   Musculoskeletal   Musculoskeletal (WDL) X   RL Extremity Weakness   LL Extremity Weakness   Genitourinary   Genitourinary (WDL) WDL   Flank Tenderness No   Suprapubic Tenderness No   Dysuria No   Psychosocial   Psychosocial (WDL) WDL   Pt awake in bed. Pt alert and oriented X4. Pt appears in no acute distress. Pt currently on room air. Pt lung sounds clear throughout all fields. Pt encouraged to cough and deep breathe. Pt denies any pain/SOB at this time. Pt bilateral PIV/dressing CDI, currently infusing. Pt denies any needs at this time. Pt encouraged to call out if any needs. Pt call bell and bedside table within reach. Will continue to monitor pt.

## 2021-01-18 NOTE — CARE COORDINATION
6/14/0779    I certify that the skilled nursing and/or rehabilitation services are required to be given on a daily basis, which as a practical matter can only be provided in a skilled nursing unit on an inpatient basis.     Electronically signed by Daniel Titus RN on 1/18/2021 at 8:27 AM

## 2021-01-18 NOTE — PROGRESS NOTES
Yes  Ambulation Assistance: Independent  Transfer Assistance: Independent  Active : Yes  Cognition        Objective     Observation/Palpation  Posture: Fair  Observation: Pt lying in bed, NAD, room air, pleasant and cooperative    AROM RLE (degrees)  RLE AROM: WFL  AROM LLE (degrees)  LLE AROM : WFL  AROM RUE (degrees)  RUE AROM : WFL  AROM LUE (degrees)  LUE AROM : WFL  Strength RLE  Strength RLE: WFL  Strength LLE  Strength LLE: WFL  Strength RUE  Strength RUE: WFL  Strength LUE  Strength LUE: WFL  Tone RLE  RLE Tone: Normotonic  Tone LLE  LLE Tone: Normotonic  Motor Control  Gross Motor?: WFL  Sensation  Overall Sensation Status: WFL  Bed mobility  Rolling to Left: Independent  Supine to Sit: Independent  Sit to Supine: Independent  Transfers  Sit to Stand: Independent  Stand to sit: Independent  Ambulation  Ambulation?: Yes  Ambulation 1  Surface: level tile  Device: No Device  Assistance: Independent  Gait Deviations: Slow Lela  Distance: in room     Balance  Posture: Fair  Sitting - Static: Good  Sitting - Dynamic: Good  Standing - Static: Good  Standing - Dynamic: Good;-        Plan   Plan  Times per week: 1 visit  Plan weeks: 1 visit            Therapy Time   Individual Concurrent Group Co-treatment   Time In           Time Out           Minutes                   Shaun Betancourt PT       Certification of Medical Necessity: It will be understood that this treatment plan is certified medically necessary by the documenting therapist and referring physician mentioned in this report. Unless the physician indicated otherwise through written correspondence with our office, all further referrals will act as certification of medical necessity on this treatment plan. Thank you for this referral.  If you have questions regarding this plan of care, please call [phone].           Revisions to this plan (optional):                     Please sign and return this plan to:   FAX: [phone]      Signature:                                 Date:

## 2021-01-18 NOTE — PROGRESS NOTES
Peer Recovery Support Note    Name: Rocio Rivero  Date: 1/18/2021    No chief complaint on file. Peer Support met with patient. [x] Support and education provided  [] Resources provided   [] Treatment referral: N/A  [] Other:  [] Patient declined peer recovery services     Referred By: Daniel Titus RN  Notes: Pt.admitted to using heroin via IV and that he had been clean since August when he was in [facility]. He said that [facility] has him on Suboxone and that it was helping some. I asked him about his support system at home and he said he has his mom and step dad. He said he doesnt' have any friends and isn't interested in going to meetings at this time. I asked him if he had ever received counseling or if he would be interested in having someone to talk to and he wasn't. I gave him my card and told him to reach out to me if he ever felt like he needed a different form of treatment or if he needed anything. This note will not be viewable in [facility] for the following reason(s). Suspected substance abuse disorder.       SignedChanel Dickinson, 1/18/2021

## 2021-01-18 NOTE — PROGRESS NOTES
Occupational Therapy  Facility/Department: [address] [place] [address] MED SURG  Daily Treatment Note  NAME: Felice Silva  : 1995  MRN: [id number]    Date of Service: 2021    Discharge Recommendations:      Home with family assist PRN    Assessment   Prognosis: Good  Decision Making: Low Complexity  No Skilled OT: Independent with functional mobility; Independent with ADL's;At baseline function; Safe to return home  REQUIRES OT FOLLOW UP: No  Activity Tolerance  Activity Tolerance: Patient Tolerated treatment well         Patient Diagnosis(es): There were no encounter diagnoses. has a past medical history of Endocarditis. has a past surgical history that includes [place] tooth extraction and other surgical history. Restrictions  Restrictions/Precautions  Restrictions/Precautions: General Precautions, Contact Precautions  Required Braces or Orthoses?: No  Subjective   General  Chart Reviewed: Yes  Patient assessed for rehabilitation services?: Yes  Family / Caregiver Present: No  Referring Practitioner: Alivia Paul PA-C  Diagnosis: Endocarditis  Subjective  Subjective: Pt states he was transferred from [facility] because he needs continued IV treatment for antibiotics. Vital Signs  Patient Currently in Pain: Denies   Orientation  Orientation  Overall Orientation Status: Within Functional Limits  Objective    ADL  Grooming: Independent  LE Dressing: Independent  Toileting: Independent        Balance  Sitting Balance: Independent  Standing Balance: Independent  Functional Mobility  Assist Level:  Independent  Bed mobility  Rolling to Right: Independent  Supine to Sit: Independent  Sit to Supine: Independent  Transfers  Stand Pivot Transfers: Independent  Sit to stand: Independent                       Cognition  Overall Cognitive Status: WFL                       LUE AROM (degrees)  LUE AROM : WFL  RUE AROM (degrees)  RUE AROM : WFL     Therapy Time   Individual Concurrent Group Co-treatment   Time In 911         Time Out 3704         Minutes 10              This note serves as a DC summary in the event of pt discharge.      Kristen Lugo, OTR/L

## 2021-01-18 NOTE — CONSULTS
Comprehensive Nutrition Assessment    Type and Reason for Visit:  Initial, Consult    Nutrition Recommendations/Plan: Will order ONS and recommend starting MVI. Will monitor weight and intakes. Nutrition Assessment:  Pt admitted with acute bacterial endocarditis and is at moderate risk of malnutrition r/t decreased appetite, fat loss, edema, and diagnosis of HIV. Received a consulted for unintended wt loss. Pt states his UBW is 170 lb but reports he has not had any wt loss recently. Will order ONS and recommend placing pt on MVI. Malnutrition Assessment:  Malnutrition Status: Moderate malnutrition   Context:  Acute Illness     Findings of the 6 clinical characteristics of malnutrition:  Energy Intake:  Mild decrease in energy intake (Comment)(reports appetite is \"so-so\")  Weight Loss:  Unable to assess     Body Fat Loss:  1 - Mild body fat loss Triceps   Muscle Mass Loss:  No significant muscle mass loss    Fluid Accumulation:  1 - Mild Extremities(non-pitting BLE)   Strength:  Not Performed    Estimated Daily Nutrient Needs:  Energy (kcal):  5531-9885(15-95 kcal/kg); Weight Used for Energy Requirements:  Current     Protein (g):  74-88(1-1.2 g/kg); Weight Used for Protein Requirements:  Current(1-1.2 g/kg)        Fluid (ml/day):  3294-5548; Method Used for Fluid Requirements:  1 ml/kcal      Nutrition Related Findings:  Pt admitted with acute bacterial endocarditis and has a PMH of IV drug use, anemia, acute hepatitis C, and HIV. He has non-pitting edema BLE. Pertinent labs: glu , alb 2.0, CD4 count was 622 on 12/21/20. NFPE findings indicated mild loss of subcutaneous fat in tricep area.       Wounds:  None       Current Nutrition Therapies:    DIET GENERAL;  Dietary Nutrition Supplements: Standard High Calorie Oral Supplement    Anthropometric Measures:  · Height: 6' (182.9 cm)  · Current Body Weight: 162 lb (73.5 kg)   · Admission Body Weight: 162 lb (73.5 kg)    · Usual Body Weight: · Ideal Body Weight: 178 lbs; % Ideal Body Weight 91 %   · BMI: 22  · Adjusted Body Weight:  ; No Adjustment   · Adjusted BMI:      · BMI Categories: Normal Weight (BMI 18.5-24. 9)       Nutrition Diagnosis:   Moderate malnutrition related to psychological cause or life stress as evidenced by intake 51-75% mild loss of subcutaneous fat, other (comment)(intake 56-75%; decreased appetite), localized or generalized fluid accumulation    Nutrition Interventions:   Food and/or Nutrient Delivery:  Continue Current Diet, Start Oral Nutrition Supplement  Nutrition Education/Counseling:  Education not appropriate   Coordination of Nutrition Care:  Continue to monitor while inpatient, Interdisciplinary Rounds    Goals:  Meet estimated needs for age/condition       Nutrition Monitoring and Evaluation:   Behavioral-Environmental Outcomes:  None Identified   Food/Nutrient Intake Outcomes:  Food and Nutrient Intake, Supplement Intake  Physical Signs/Symptoms Outcomes:  Biochemical Data, Weight, Nutrition Focused Physical Findings, Fluid Status or Edema     Discharge Planning:    Continue current diet, Continue Oral Nutrition Supplement     Electronically signed by Teryl Burkitt on 1/18/21 at 1:18 PM EST

## 2021-01-18 NOTE — PLAN OF CARE
Problem:  Activity:  Goal: Muscle strength will improve  Description: Muscle strength will improve  Outcome: Ongoing     Problem: Physical Regulation:  Goal: Ability to avoid or minimize complications of infection will improve  Description: Ability to avoid or minimize complications of infection will improve  Outcome: Ongoing     Problem: Safety:  Goal: Free from accidental physical injury  Description: Free from accidental physical injury  Outcome: Ongoing

## 2021-01-19 LAB
QUANTI TB GOLD PLUS: NEGATIVE
QUANTI TB1 MINUS NIL: 0 IU/ML (ref 0–0.34)
QUANTI TB2 MINUS NIL: 0 IU/ML (ref 0–0.34)
QUANTIFERON MITOGEN: 9.83 IU/ML
QUANTIFERON NIL: 0.03 IU/ML

## 2021-01-19 PROCEDURE — 6360000002 HC RX W HCPCS: Performed by: PHYSICIAN ASSISTANT

## 2021-01-19 PROCEDURE — 6370000000 HC RX 637 (ALT 250 FOR IP): Performed by: PHYSICIAN ASSISTANT

## 2021-01-19 PROCEDURE — 1200000002 HC SEMI PRIVATE SWING BED

## 2021-01-19 PROCEDURE — 99309 SBSQ NF CARE MODERATE MDM 30: CPT | Performed by: INTERNAL MEDICINE

## 2021-01-19 PROCEDURE — 2580000003 HC RX 258: Performed by: PHYSICIAN ASSISTANT

## 2021-01-19 RX ADMIN — DOXYCYCLINE 100 MG: 100 CAPSULE ORAL at 20:49

## 2021-01-19 RX ADMIN — METOPROLOL TARTRATE 12.5 MG: 25 TABLET, FILM COATED ORAL at 20:48

## 2021-01-19 RX ADMIN — FAMOTIDINE 20 MG: 20 TABLET, FILM COATED ORAL at 09:19

## 2021-01-19 RX ADMIN — FAMOTIDINE 20 MG: 20 TABLET, FILM COATED ORAL at 20:48

## 2021-01-19 RX ADMIN — MIRTAZAPINE 15 MG: 15 TABLET, FILM COATED ORAL at 20:49

## 2021-01-19 RX ADMIN — FERROUS SULFATE TAB EC 324 MG (65 MG FE EQUIVALENT) 324 MG: 324 (65 FE) TABLET DELAYED RESPONSE at 09:19

## 2021-01-19 RX ADMIN — FERROUS SULFATE TAB EC 324 MG (65 MG FE EQUIVALENT) 324 MG: 324 (65 FE) TABLET DELAYED RESPONSE at 16:24

## 2021-01-19 RX ADMIN — DEXTROSE MONOHYDRATE: 50 INJECTION, SOLUTION INTRAVENOUS at 00:58

## 2021-01-19 RX ADMIN — DOCUSATE SODIUM 100 MG: 100 CAPSULE, LIQUID FILLED ORAL at 20:48

## 2021-01-19 RX ADMIN — METOPROLOL TARTRATE 12.5 MG: 25 TABLET, FILM COATED ORAL at 09:17

## 2021-01-19 RX ADMIN — HYDROXYZINE HYDROCHLORIDE 25 MG: 25 TABLET, FILM COATED ORAL at 20:52

## 2021-01-19 RX ADMIN — BUPRENORPHINE AND NALOXONE 2 TABLET: 8; 2 TABLET SUBLINGUAL at 09:18

## 2021-01-19 RX ADMIN — FOLIC ACID 1 MG: 1 TABLET ORAL at 09:19

## 2021-01-19 RX ADMIN — HYDROXYZINE HYDROCHLORIDE 25 MG: 25 TABLET, FILM COATED ORAL at 16:29

## 2021-01-19 RX ADMIN — ENOXAPARIN SODIUM 40 MG: 40 INJECTION SUBCUTANEOUS at 09:19

## 2021-01-19 RX ADMIN — DOCUSATE SODIUM 100 MG: 100 CAPSULE, LIQUID FILLED ORAL at 09:17

## 2021-01-19 RX ADMIN — DOXYCYCLINE 100 MG: 100 CAPSULE ORAL at 09:19

## 2021-01-19 NOTE — PLAN OF CARE
Problem: Falls - Risk of:  Goal: Will remain free from falls  Description: Will remain free from falls  Outcome: Ongoing  Goal: Absence of physical injury  Description: Absence of physical injury  Outcome: Ongoing     Problem:  Activity:  Goal: Capacity to carry out activities will improve, regarding daily activities  Description: Capacity to carry out activities will improve, regarding daily activities  1/19/2021 0028 by Luis Alberto Orozco RN  Outcome: Ongoing     Problem: Health Behavior:  Goal: Ability to identify changes in lifestyle to reduce recurrence of condition will improve  Description: Ability to identify changes in lifestyle to reduce recurrence of condition will improve  1/19/2021 0028 by Luis Alberto Orozco RN  Outcome: Ongoing     Problem: Physical Regulation:  Goal: Ability to avoid or minimize complications of infection will improve  Description: Ability to avoid or minimize complications of infection will improve  Outcome: Ongoing     Problem: Daily Care:  Goal: Daily care needs are met  Description: Daily care needs are met  1/19/2021 1103 by Usman Ortiz RN  Outcome: Ongoing  1/19/2021 0028 by Luis Alberto Orozco RN  Outcome: Ongoing     Problem: Pain:  Goal: Patient's pain/discomfort is manageable  Description: Patient's pain/discomfort is manageable  Outcome: Ongoing

## 2021-01-19 NOTE — PROGRESS NOTES
Progress Note      Subjective:   Chief complaint:   TV and AV Endocarditis  Septic Emboli  Streptococcus Gordoni Bacteremia     Interval History:   Seen and examined multiple times this week. He reports having regular bowel movements. He denies any specific complaints. Review of systems:   Constitutional:  Denies fever or chills. Positive for fatigue. Eyes:  Denies change in visual acuity or discharge. HENT:  Denies nasal congestion or sore throat. Respiratory:  Denies cough or shortness of breath. Cardiovascular:  Denies chest pain, palpitation or swelling in LEs. GI:  Denies abdominal pain, nausea, vomiting, bloody stools or diarrhea. :  Denies dysuria or frequency. Musculoskeletal:  Denies back pain or joint pain. Integument:  Denies rash or itching. Neurologic:  Denies headache, focal weakness or sensory changes. Endocrine:  Denies polyuria or polydipsia. Lymphatic:  Denies swollen glands or night sweats. Psychiatric:  Denies depression or anxiety. Past medical history, surgical history, family history and social history reviewed and unchanged compared to H&P earlier this admission. Medications:   Scheduled Meds:   folic acid  1 mg Oral Daily    ferrous sulfate  324 mg Oral BID WC    doxycycline monohydrate  100 mg Oral 2 times per day    bictegravir-emtricitab-tenofovir alafenamide  1 tablet Oral Daily    buprenorphine-naloxone  2 tablet Sublingual Daily    docusate sodium  100 mg Oral BID    metoprolol tartrate  12.5 mg Oral BID    mirtazapine  15 mg Oral Nightly    enoxaparin  40 mg Subcutaneous Daily    famotidine  20 mg Oral BID    nicotine  1 patch Transdermal Q24H     Continuous Infusions:   sodium chloride      IV infusion builder 42.5 mL/hr at 01/19/21 0058       Objective:     Vital Signs  Temp: 98.6 °F (37 °C)  Pulse: 113  Resp: 18  BP: (!) 110/43  SpO2: 93 %  O2 Device: None (Room air)       Vital signs reviewed in electronic charts.     Physical exam    Constitutional:  Well developed, thin, pale lying in bed in no acute distress. Eyes:  PERRL, conjunctiva normal, EOMI. HENT:  Atraumatic, external ears normal, external nose/nares normal, oropharynx moist, no pharyngeal exudates. Temporal wasting. Neck:  Supple. No JVD or thyromegaly. Respiratory:  No respiratory distress, normal breath sounds, no rales, no wheezing. Cardiovascular:  Normal rate, normal rhythm, no murmurs, no gallops, no rubs. GI:  Soft, nondistended, normal bowel sounds, nontender, no organomegaly, no mass. :  No costovertebral angle tenderness. Musculoskeletal:  No edema, no tenderness, no obvious deformities. Patient is moving all extremities. Integument:  Well hydrated, no rash. Pale. Lymphatic:  No cervical or axillary lymphadenopathy noted. Neurologic:  Alert & oriented x 3,  no focal deficits noted. Strength is equal throughout. Psychiatric:  Speech and behavior appropriate. Results:     Lab Results   Component Value Date    WBC 7.1 01/18/2021    HGB 7.2 (L) 01/18/2021    HCT 24.5 (L) 01/18/2021    MCV 84.2 01/18/2021     (H) 01/18/2021       Lab Results   Component Value Date     01/18/2021    K 3.8 01/18/2021    K 4.0 01/17/2021     01/18/2021    CO2 26 01/18/2021    BUN 7 01/18/2021    CREATININE 0.6 01/18/2021    GLUCOSE 159 01/18/2021    CALCIUM 8.0 01/18/2021        Assessment and Plan:      Active Hospital Problems     Diagnosis Date Noted    [facility] [F19.11]  - History of opioid abuse   - Started on suboxone by Addiction Medicine at [facility]; follow up when scheduled  - encourage ongoing cessation  -consulted [employer], but pt declined any counseling or rehab   01/16/2021    HIV (human immunodeficiency virus infection) ([facility] [B20]  - Newly diagnosed while at [facility] chart review last CD4 count > 400 (622 on 12/28)  - Follow up with ID as scheduled as outpatient    01/16/2021    Fever [R50.9]  - on arrival to this facility patient with low grade fever 99.5-100.8   - confirmed no PICC line in place   - on PCN G as below in setting of endocarditis, bacteremia, septic emboli   - Did call and discuss the case with CT surgery team at [facility] Dr. Landon Dupree 1/16 and he recommends blood cultures but otherwise somewhat expects low grade fever in this patient with TV involvement and septic emboli. He had temp 100.9 at [facility] over last few days prior to transfer to this facility.   - CXR with evidence of possible pneumonia - did review recent CXRs at outside facility including most recent 1/11/21 which shows bibasilar atelectasis and small bilateral pleural effusions   - Continue PCN G. Added Vancomycin and PO doxycycline with suspected pneumonia and ongoing fever. Blood cultures negative. DC'ed vancomycin 1/18. Continue doxycycline for a total of 7 days.   - Did review last ID note from [facility] - ID signed off on this case on 12/31/20 with recommendations for 4 weeks PCN G; end date 1/25/21 01/16/2021    Acute bacterial endocarditis [I33.0]  - ECHO with TV and AV vegetations  - Blood cultures streptococcus gordoni 12/24 and repeat cultures 12/28 no growt  - CT/CTA head, CTA neck no acute findings  - CT chest multiple septic pulmonary emboli  - MRI chest without evidence of osteomyelitis 12/31    - Managed by [facility] Cardiothoracic surgery team from 12/28 to 1/15  - Per infectious disease recommendations he was started on penicillin G 12/28    - due to persistent fevers he underwent angiovac debulking of tricuspid valve with Dr. Real Drake on 1/7   - Continue IV PCN G until 1/25/21 and follow up with Dr. Daphney Henriquez 3/3/21 @ 11:30 AM (Will have echo first at 11:30 then see Dr. Daphney Henriquez)   - Follow up with ID Georgie MUNOZ 2/10/21 @ 2pm  - CBC with diff, CMP, CRP weekly on mondays (1/11 .5)    01/16/2021    Septic embolism (HCC) Radames Haja  - see above   - currently on room air   01/16/2021    hepatitis C virus infection without hepatic coma [B17.10]  - follow up as outpatient  - Discussed with Jayson Manning PharmD and with HIV and Hep C infections will not be appropriate for our Hep C clinic and will need to follow up at [facility]    01/16/2021    Bacteremia [R78.81]  - Blood cx + streptococcus gordoni 12/24 and clear since 12/28  - Repeat blood cx pending with persistent fever   - antibiotic regimen adjusted as above and will continue to adjust as indicated    01/16/2021    Anemia [D64.9]  -hgb 6.9 on 1/16--> 7.2 on 1/18  -blood transfusion ordered 1/16, but stopped after pt had a reaction and refused to continue infusion  -iron studies with evidence of deficiency; received IV venofer 1/16 - 1/18  -also evidence of folate deficiency  -continue GI ppx   01/16/2021    Opioid dependence ([facility] [F11.20]  - continue suboxone and follow up with [facility] addiction medicine as scheduled ( to confirm appointment)    01/16/2021    Pneumonia [J18.9]  -CXR 1/16 with patchy bibasilar opacities with small pleural effusions, greater on the right, possibly infectious  -Covered empirically with vancomycin and doxycycline in addition to penicillin G until blood cultures returned. Blood cultures negative. Vancomycin discontinued. We will continue doxycycline for an additional 7 days. This was discussed with Dr. Alejandro Lopez, [facility] ID on 1/18 and he agreed with plan. 01/16/2021   · Dental caries  - Panorex at [facility] with subtle lucency in tooth #2 which could represent dental caries. Follow up with dentistry as outpateint. Patient was seen and examined by Dr. Samuels Comment and plan of care reviewed.       Electronically signed by EVERARDO Charles on 1/19/2021 at 12:48 PM

## 2021-01-19 NOTE — FLOWSHEET NOTE
01/19/21 0920   Assessment   Charting Type Shift assessment   Neurological   Neuro (WDL) WDL   [place] Coma Scale   Eye Opening 4   Best Verbal Response 5   Best Motor Response 6   [place] Coma Scale Score 15   NIH/MNHISS Stroke Scale   NIH/MNIHSS Stroke Scale Assessed No   HEENT   HEENT (WDL) WDL   Respiratory   Respiratory (WDL) X   Respiratory Pattern Regular   Respiratory Depth Normal   Respiratory Quality/Effort Unlabored   Chest Assessment Chest expansion symmetrical;Trachea midline   L Breath Sounds Clear   R Breath Sounds Clear   Breath Sounds   Right Upper Lobe Clear   Right Middle Lobe Clear   Right Lower Lobe Clear   Left Upper Lobe Clear   Left Lower Lobe Clear   Cough/Sputum   Cough None   Cardiac   Cardiac (WDL) X   Cardiac Regularity Regular   Cardiac Monitor   Bedside Cardiac Monitor On No   Telemetry Monitor On No   Gastrointestinal   Abdominal (WDL) WDL   Abdomen Inspection Flat   RUQ Bowel Sounds Active   LUQ Bowel Sounds Active   RLQ Bowel Sounds Active   LLQ Bowel Sounds Active   Peripheral Vascular   Peripheral Vascular (WDL) X   Edema Right lower extremity; Left lower extremity   RLE Edema Non-pitting   LLE Edema Non-pitting   Skin Color/Condition   Skin Color/Condition (WDL) X   Skin Color Pale   Skin Condition/Temp Warm;Dry   Skin Integrity   Skin Integrity (WDL) WDL   Musculoskeletal   Musculoskeletal (WDL) X   RL Extremity Weakness   LL Extremity Weakness   Genitourinary   Genitourinary (WDL) WDL   Flank Tenderness No   Suprapubic Tenderness No   Dysuria No   Psychosocial   Psychosocial (WDL) WDL   Patient Behaviors Calm   Pt awake in bed. Pt alert and oriented. Pt appears in no acute distress. Pt currently on RA. Pt lung sounds clear throughout. Pt states pain, see eMAR for intervention. Pt encouraged to cough and deep breathe. Pt call bell and bedside table within reach. Will continue to monitor pt.

## 2021-01-19 NOTE — FLOWSHEET NOTE
01/18/21 2035   Assessment   Charting Type Shift assessment   Neurological   Neuro (WDL) WDL   [place] Coma Scale   Eye Opening 4   Best Verbal Response 5   Best Motor Response 6   [place] Coma Scale Score 15   HEENT   HEENT (WDL) WDL   Respiratory   Respiratory (WDL) X   Respiratory Pattern Regular   Respiratory Depth Normal   Respiratory Quality/Effort Unlabored   Chest Assessment Chest expansion symmetrical;Trachea midline   L Breath Sounds Clear   R Breath Sounds Clear   Breath Sounds   Right Upper Lobe Clear   Right Middle Lobe Clear   Right Lower Lobe Clear   Left Upper Lobe Clear   Left Lower Lobe Clear   Cough/Sputum   Cough None   Cardiac   Cardiac (WDL) X   Cardiac Regularity Regular   Cardiac Monitor   Bedside Cardiac Monitor On No   Telemetry Monitor On No   Gastrointestinal   Abdominal (WDL) WDL   Abdomen Inspection Flat   RUQ Bowel Sounds Active   LUQ Bowel Sounds Active   RLQ Bowel Sounds Active   LLQ Bowel Sounds Active   Peripheral Vascular   Peripheral Vascular (WDL) X   Edema Right lower extremity; Left lower extremity   RLE Edema Non-pitting   LLE Edema Non-pitting   Skin Color/Condition   Skin Color/Condition (WDL) X   Skin Integrity   Skin Integrity (WDL) WDL   Musculoskeletal   Musculoskeletal (WDL) X   RL Extremity Weakness   LL Extremity Weakness   Genitourinary   Genitourinary (WDL) WDL   Flank Tenderness No   Suprapubic Tenderness No   Dysuria No   Psychosocial   Psychosocial (WDL) WDL   Patient Behaviors Calm   Pt awake in bed. Pt alert and oriented X4. Pt appears in no acute distress, currently on room air, VSS. PCN currently infusing at 42.4 mL/hr in PIV. Pt denies SOB. Pt administered PM medications, tolerated well. Pt denies any needs at this time. Pt encouraged to call out if any needs. Call light and bedside table within reach. Will continue to monitor Pt.

## 2021-01-19 NOTE — PLAN OF CARE
Problem:  Activity:  Goal: Capacity to carry out activities will improve, regarding daily activities  Description: Capacity to carry out activities will improve, regarding daily activities  Outcome: Ongoing     Problem: Health Behavior:  Goal: Ability to identify changes in lifestyle to reduce recurrence of condition will improve  Description: Ability to identify changes in lifestyle to reduce recurrence of condition will improve  Outcome: Ongoing     Problem: Daily Care:  Goal: Daily care needs are met  Description: Daily care needs are met  Outcome: Ongoing

## 2021-01-20 ENCOUNTER — APPOINTMENT (OUTPATIENT)
Dept: GENERAL RADIOLOGY | Facility: HOSPITAL | Age: 26
DRG: 288 | End: 2021-01-20
Attending: INTERNAL MEDICINE
Payer: MEDICAID

## 2021-01-20 LAB
BLOOD CULTURE, ROUTINE: NORMAL
CULTURE, BLOOD 2: NORMAL
SARS-COV-2, NAAT: NOT DETECTED

## 2021-01-20 PROCEDURE — 2580000003 HC RX 258: Performed by: PHYSICIAN ASSISTANT

## 2021-01-20 PROCEDURE — 1200000002 HC SEMI PRIVATE SWING BED

## 2021-01-20 PROCEDURE — 6370000000 HC RX 637 (ALT 250 FOR IP): Performed by: PHYSICIAN ASSISTANT

## 2021-01-20 PROCEDURE — 6360000002 HC RX W HCPCS: Performed by: PHYSICIAN ASSISTANT

## 2021-01-20 PROCEDURE — 71046 X-RAY EXAM CHEST 2 VIEWS: CPT

## 2021-01-20 PROCEDURE — U0002 COVID-19 LAB TEST NON-CDC: HCPCS

## 2021-01-20 RX ORDER — POLYETHYLENE GLYCOL 3350 17 G/17G
17 POWDER, FOR SOLUTION ORAL ONCE
Status: COMPLETED | OUTPATIENT
Start: 2021-01-20 | End: 2021-01-20

## 2021-01-20 RX ADMIN — POLYETHYLENE GLYCOL 3350 17 G: 17 POWDER, FOR SOLUTION ORAL at 16:16

## 2021-01-20 RX ADMIN — FAMOTIDINE 20 MG: 20 TABLET, FILM COATED ORAL at 21:35

## 2021-01-20 RX ADMIN — POLYETHYLENE GLYCOL 3350 17 G: 17 POWDER, FOR SOLUTION ORAL at 22:01

## 2021-01-20 RX ADMIN — DOCUSATE SODIUM 100 MG: 100 CAPSULE, LIQUID FILLED ORAL at 21:35

## 2021-01-20 RX ADMIN — FOLIC ACID 1 MG: 1 TABLET ORAL at 08:32

## 2021-01-20 RX ADMIN — DEXTROSE MONOHYDRATE: 50 INJECTION, SOLUTION INTRAVENOUS at 00:48

## 2021-01-20 RX ADMIN — FERROUS SULFATE TAB EC 324 MG (65 MG FE EQUIVALENT) 324 MG: 324 (65 FE) TABLET DELAYED RESPONSE at 08:31

## 2021-01-20 RX ADMIN — DOXYCYCLINE 100 MG: 100 CAPSULE ORAL at 21:36

## 2021-01-20 RX ADMIN — METOPROLOL TARTRATE 12.5 MG: 25 TABLET, FILM COATED ORAL at 21:36

## 2021-01-20 RX ADMIN — MIRTAZAPINE 15 MG: 15 TABLET, FILM COATED ORAL at 21:36

## 2021-01-20 RX ADMIN — HYDROXYZINE HYDROCHLORIDE 25 MG: 25 TABLET, FILM COATED ORAL at 08:56

## 2021-01-20 RX ADMIN — BUPRENORPHINE AND NALOXONE 2 TABLET: 8; 2 TABLET SUBLINGUAL at 08:31

## 2021-01-20 RX ADMIN — HYDROXYZINE HYDROCHLORIDE 25 MG: 25 TABLET, FILM COATED ORAL at 17:35

## 2021-01-20 RX ADMIN — DOCUSATE SODIUM 100 MG: 100 CAPSULE, LIQUID FILLED ORAL at 08:31

## 2021-01-20 RX ADMIN — DOXYCYCLINE 100 MG: 100 CAPSULE ORAL at 08:31

## 2021-01-20 RX ADMIN — METOPROLOL TARTRATE 12.5 MG: 25 TABLET, FILM COATED ORAL at 08:32

## 2021-01-20 RX ADMIN — FERROUS SULFATE TAB EC 324 MG (65 MG FE EQUIVALENT) 324 MG: 324 (65 FE) TABLET DELAYED RESPONSE at 17:35

## 2021-01-20 RX ADMIN — FAMOTIDINE 20 MG: 20 TABLET, FILM COATED ORAL at 08:31

## 2021-01-20 RX ADMIN — ENOXAPARIN SODIUM 40 MG: 40 INJECTION SUBCUTANEOUS at 08:30

## 2021-01-20 ASSESSMENT — PAIN SCALES - GENERAL: PAINLEVEL_OUTOF10: 0

## 2021-01-20 NOTE — FLOWSHEET NOTE
01/20/21 0832   Assessment   Charting Type Shift assessment   Neurological   Neuro (WDL) WDL   [place] Coma Scale   Eye Opening 4   Best Verbal Response 5   Best Motor Response 6   [place] Coma Scale Score 15   HEENT   HEENT (WDL) WDL   Respiratory   Respiratory (WDL) X   Respiratory Pattern Regular   Respiratory Depth Normal   Respiratory Quality/Effort Unlabored   Chest Assessment Chest expansion symmetrical;Trachea midline   L Breath Sounds Clear   R Breath Sounds Clear   Breath Sounds   Right Upper Lobe Clear   Right Middle Lobe Clear   Right Lower Lobe Clear   Left Upper Lobe Clear   Left Lower Lobe Clear   Cough/Sputum   Cough None   Cardiac   Cardiac (WDL) WDL   Cardiac Monitor   Bedside Cardiac Monitor On No   Telemetry Monitor On No   Gastrointestinal   Abdominal (WDL) WDL   Abdomen Inspection Flat   RUQ Bowel Sounds Active   LUQ Bowel Sounds Active   RLQ Bowel Sounds Active   LLQ Bowel Sounds Active   Peripheral Vascular   Peripheral Vascular (WDL) WDL   Skin Color/Condition   Skin Color/Condition (WDL) WDL   Skin Color Appropriate for ethnicity   Skin Condition/Temp Warm;Dry   Skin Integrity   Skin Integrity (WDL) WDL   Musculoskeletal   Musculoskeletal (WDL) X   RL Extremity Weakness   LL Extremity Weakness   Genitourinary   Genitourinary (WDL) WDL   Flank Tenderness No   Suprapubic Tenderness No   Dysuria No   Urine Assessment   Incontinence No   Psychosocial   Psychosocial (WDL) WDL   Patient Behaviors Calm     Pt awake in bed. Pt alert and oriented x 4. Pt appears in no acute distress. Pt currently on RA. Pt lung sounds clear. Pt encouraged to cough and deep breathe. No edema noted. Patient reports pain level of 3, eMar for interventions. Pt noted to have intermittent low grade temperature. He refused Tylenol, blanket removed and room temperature adjusted. Pt call bell and bedside table within reach. Will continue to monitor pt.

## 2021-01-20 NOTE — PROGRESS NOTES
Since pt has continued to spike fevers and his antibiotics are set to stop on 1/25/21, I discussed case with [facility], Dr. Jeanmarie Pressley. Per , in absence of any new symptoms (joint pain, back pain, cough, congestion, GI symptoms, etc.), patient's ongoing intermittent fevers are likely due to his initial high burden of infection. Patient should be fine to complete therapy as planned on 1/25/2021. CRP has trended down. Per review of [facility] records, CRP was initially 164.5 then 138.5 on 1/11. CRP 68 on 1/16. COVID-19 antigen 1/20/21 negative. Patient has follow-up with [facility] on 1/26/2021. He will be instructed on discharge if he continues to have fevers to return to the hospital for reevaluation.

## 2021-01-20 NOTE — FLOWSHEET NOTE
01/19/21 2048   Assessment   Charting Type Shift assessment   Neurological   Neuro (WDL) WDL   [place] Coma Scale   Eye Opening 4   Best Verbal Response 5   Best Motor Response 6   [place] Coma Scale Score 15   HEENT   HEENT (WDL) WDL   Respiratory   Respiratory (WDL) X   Respiratory Pattern Regular   Respiratory Depth Normal   Respiratory Quality/Effort Unlabored   Chest Assessment Chest expansion symmetrical;Trachea midline   L Breath Sounds Clear   R Breath Sounds Clear   Breath Sounds   Right Upper Lobe Clear   Right Middle Lobe Clear   Right Lower Lobe Clear   Left Upper Lobe Clear   Left Lower Lobe Clear   Cardiac   Cardiac (WDL) X   Cardiac Regularity Regular   Cardiac Rhythm ST   Cardiac Monitor   Bedside Cardiac Monitor On No   Telemetry Monitor On No   Gastrointestinal   Abdominal (WDL) WDL   Abdomen Inspection Flat   RUQ Bowel Sounds Active   LUQ Bowel Sounds Active   RLQ Bowel Sounds Active   LLQ Bowel Sounds Active   Peripheral Vascular   Peripheral Vascular (WDL) X   Edema Right lower extremity; Left lower extremity   RLE Edema Non-pitting   LLE Edema Non-pitting   Skin Color/Condition   Skin Color/Condition (WDL) X   Skin Color Pale   Skin Condition/Temp Warm;Dry   Skin Integrity   Skin Integrity (WDL) WDL   Musculoskeletal   Musculoskeletal (WDL) X   RL Extremity Weakness   LL Extremity Weakness   Genitourinary   Genitourinary (WDL) WDL   Flank Tenderness No   Suprapubic Tenderness No   Dysuria No   Urine Assessment   Incontinence No   Urine Color Yellow/straw   Urine Appearance Clear   Psychosocial   Psychosocial (WDL) WDL   Patient Behaviors Calm   Pt awake in bed. Pt alert and oriented X4. Pt appears in no acute distress, currently on room air. Pt took PM medications whole with water, tolerated well. Pt bilateral PIV/dressing CDI. Pt denies any other needs at this time. Pt encouraged to call out if any needs. Call light and bedside table within Pt reach. Will continue to monitor Pt.

## 2021-01-21 PROCEDURE — 1200000002 HC SEMI PRIVATE SWING BED

## 2021-01-21 PROCEDURE — 6370000000 HC RX 637 (ALT 250 FOR IP): Performed by: PHYSICIAN ASSISTANT

## 2021-01-21 PROCEDURE — 97803 MED NUTRITION INDIV SUBSEQ: CPT

## 2021-01-21 PROCEDURE — 6360000002 HC RX W HCPCS: Performed by: PHYSICIAN ASSISTANT

## 2021-01-21 PROCEDURE — 2580000003 HC RX 258: Performed by: PHYSICIAN ASSISTANT

## 2021-01-21 RX ORDER — LACTULOSE 10 G/15ML
20 SOLUTION ORAL 3 TIMES DAILY
Status: DISCONTINUED | OUTPATIENT
Start: 2021-01-21 | End: 2021-01-25 | Stop reason: HOSPADM

## 2021-01-21 RX ADMIN — HYDROXYZINE HYDROCHLORIDE 25 MG: 25 TABLET, FILM COATED ORAL at 18:25

## 2021-01-21 RX ADMIN — DOXYCYCLINE 100 MG: 100 CAPSULE ORAL at 20:22

## 2021-01-21 RX ADMIN — DEXTROSE MONOHYDRATE: 50 INJECTION, SOLUTION INTRAVENOUS at 22:53

## 2021-01-21 RX ADMIN — DOCUSATE SODIUM 100 MG: 100 CAPSULE, LIQUID FILLED ORAL at 08:54

## 2021-01-21 RX ADMIN — FOLIC ACID 1 MG: 1 TABLET ORAL at 08:54

## 2021-01-21 RX ADMIN — DEXTROSE MONOHYDRATE: 50 INJECTION, SOLUTION INTRAVENOUS at 01:51

## 2021-01-21 RX ADMIN — FAMOTIDINE 20 MG: 20 TABLET, FILM COATED ORAL at 20:22

## 2021-01-21 RX ADMIN — LACTULOSE 20 G: 20 SOLUTION ORAL at 11:13

## 2021-01-21 RX ADMIN — FERROUS SULFATE TAB EC 324 MG (65 MG FE EQUIVALENT) 324 MG: 324 (65 FE) TABLET DELAYED RESPONSE at 08:55

## 2021-01-21 RX ADMIN — ENOXAPARIN SODIUM 40 MG: 40 INJECTION SUBCUTANEOUS at 08:55

## 2021-01-21 RX ADMIN — DOXYCYCLINE 100 MG: 100 CAPSULE ORAL at 08:54

## 2021-01-21 RX ADMIN — MIRTAZAPINE 15 MG: 15 TABLET, FILM COATED ORAL at 20:22

## 2021-01-21 RX ADMIN — LACTULOSE 20 G: 20 SOLUTION ORAL at 14:59

## 2021-01-21 RX ADMIN — FERROUS SULFATE TAB EC 324 MG (65 MG FE EQUIVALENT) 324 MG: 324 (65 FE) TABLET DELAYED RESPONSE at 18:25

## 2021-01-21 RX ADMIN — METOPROLOL TARTRATE 12.5 MG: 25 TABLET, FILM COATED ORAL at 08:54

## 2021-01-21 RX ADMIN — METOPROLOL TARTRATE 12.5 MG: 25 TABLET, FILM COATED ORAL at 20:22

## 2021-01-21 RX ADMIN — BUPRENORPHINE AND NALOXONE 2 TABLET: 8; 2 TABLET SUBLINGUAL at 08:54

## 2021-01-21 RX ADMIN — FAMOTIDINE 20 MG: 20 TABLET, FILM COATED ORAL at 08:56

## 2021-01-21 RX ADMIN — HYDROXYZINE HYDROCHLORIDE 25 MG: 25 TABLET, FILM COATED ORAL at 09:06

## 2021-01-21 RX ADMIN — HYDROXYZINE HYDROCHLORIDE 25 MG: 25 TABLET, FILM COATED ORAL at 00:08

## 2021-01-21 NOTE — CARE COORDINATION
Interdisciplinary rounding completed. Ysabel (provider rep), Izzy Valladares (), Oliverio Rivera (nursing) Dara Oppenheim (pharmacy), and Faiza (dietitian) all involved. Activities reviewed with OT.      no DC needs noted at this time wt. 162lb; generalized diet w/ ONS BID. Met criteria for moderate malnutrition on admit. Eating 51-75% x 7 meals. Non-pitting edema BLE. Recommend MVI - pt is allergic to ingredients Penicillin G 18 million units daily through 1/25/21 for endocarditis, bacteremia, and septic emboli. Doxycycline 100mg BID D6/7 for pneumonia. Biktarvy -25mg po dialy for HIV. Lovenox 40mg subq daily for VTE prophylaxis. Pt will be going home once completed IV abx. Mother will provide transportation. No DC needs noted.

## 2021-01-21 NOTE — FLOWSHEET NOTE
01/21/21 0909   Assessment   Charting Type Shift assessment   Neurological   Neuro (WDL) WDL   [place] Coma Scale   Eye Opening 4   Best Verbal Response 5   Best Motor Response 6   [place] Coma Scale Score 15   HEENT   HEENT (WDL) WDL   Respiratory   Respiratory (WDL) X   Respiratory Pattern Regular   Respiratory Depth Normal   Respiratory Quality/Effort Unlabored   Chest Assessment Chest expansion symmetrical;Trachea midline   L Breath Sounds Clear   R Breath Sounds Clear   Breath Sounds   Right Upper Lobe Clear   Right Middle Lobe Clear   Right Lower Lobe Clear   Left Upper Lobe Clear   Left Lower Lobe Clear   Cough/Sputum   Cough None   Cardiac   Cardiac (WDL) WDL   Cardiac Regularity Regular   Cardiac Monitor   Bedside Cardiac Monitor On No   Telemetry Monitor On No   Gastrointestinal   Abdominal (WDL) WDL   Abdomen Inspection Flat   RUQ Bowel Sounds Active   LUQ Bowel Sounds Active   RLQ Bowel Sounds Active   LLQ Bowel Sounds Active   Peripheral Vascular   Peripheral Vascular (WDL) WDL   Edema Right lower extremity; Left lower extremity   RLE Edema Non-pitting   LLE Edema Non-pitting   Skin Color/Condition   Skin Color/Condition (WDL) WDL   Skin Color Appropriate for ethnicity   Skin Condition/Temp Warm;Dry   Skin Integrity   Skin Integrity (WDL) WDL   Musculoskeletal   Musculoskeletal (WDL) X   RL Extremity Weakness   LL Extremity Weakness   Genitourinary   Genitourinary (WDL) WDL   Flank Tenderness No   Suprapubic Tenderness No   Dysuria No   Urine Assessment   Incontinence No   Urine Color Yellow/straw   Urine Appearance Clear   Psychosocial   Psychosocial (WDL) WDL   Patient Behaviors Calm     Pt awake in bed. Pt alert and oriented x 4. Pt appears in no acute distress. Pt currently on RA. Pt lung sounds clear. Pt encouraged to cough and deep breathe. No edema noted. Patient reports pain level at 2 this AM, see eMAR for interventions. Continuous IV PCN infusing, dsg CDI.  PA added lactulose today, last BM

## 2021-01-21 NOTE — PLAN OF CARE
Problem: Falls - Risk of:  Goal: Will remain free from falls  Description: Will remain free from falls  Outcome: Ongoing     Problem: Safety:  Goal: Free from accidental physical injury  Description: Free from accidental physical injury  Outcome: Ongoing     Problem: Pain:  Goal: Patient's pain/discomfort is manageable  Description: Patient's pain/discomfort is manageable  Outcome: Ongoing     Problem: Skin Integrity:  Goal: Skin integrity will stabilize  Description: Skin integrity will stabilize  Outcome: Ongoing

## 2021-01-21 NOTE — PROGRESS NOTES
Nutrition Assessment     Type and Reason for Visit: (Follow up)    Nutrition Recommendations/Plan: Has ONS ordered and will monitor weight and intakes. Nutrition Assessment:  Pt meets criteria for moderate malnutrition. Has ONS ordered and will continue to monitor weight and intakes. Malnutrition Assessment:  Malnutrition Status: Moderate malnutrition    Estimated Daily Nutrient Needs:  Energy (kcal): 3031-4922(40-14 kcal/kg); Weight Used for Energy Requirements:  Current     Protein (g): 74-88(1-1.2 g/kg); Weight Used for Protein Requirements:  Current(1-1.2 g/kg)        Fluid (ml/day): 3840-5228; Weight Used for Fluid Requirements:  1 ml/kcal      Nutrition Related Findings: Pt admitted with acute bacterial endocarditis. No edema at this time. Remeron and iron ordered.  Pertinent labs: glu , total protein 5.1, alb 2.1      Current Nutrition Therapies:    DIET GENERAL;  Dietary Nutrition Supplements: Standard High Calorie Oral Supplement    Anthropometric Measures:  · Height: 6' (182.9 cm)  · Current Body Wt: 162 lb (73.5 kg)   · BMI: 22    Nutrition Diagnosis:   · Moderate malnutrition related to psychological cause or life stress as evidenced by mild loss of subcutaneous fat, intake 51-75%(intakes 65-89% x 7)      Nutrition Interventions:   Food and/or Nutrient Delivery:  Continue Current Diet, Continue Oral Nutrition Supplement  Nutrition Education/Counseling:  Education not appropriate   Coordination of Nutrition Care:  Continue to monitor while inpatient, Interdisciplinary Rounds    Goals:  meet estimated needs for age/condition       Nutrition Monitoring and Evaluation:   Behavioral-Environmental Outcomes:  None Identified   Food/Nutrient Intake Outcomes:  Food and Nutrient Intake, Supplement Intake  Physical Signs/Symptoms Outcomes:  Biochemical Data, Nutrition Focused Physical Findings, Weight, Fluid Status or Edema     Discharge Planning:    Continue Oral Nutrition Supplement, Continue current diet     Electronically signed by Katherin Arrieta on 1/21/21 at 10:52 AM EST

## 2021-01-22 LAB
A/G RATIO: 0.9 (ref 0.8–2)
ALBUMIN SERPL-MCNC: 2.7 G/DL (ref 3.4–4.8)
ALP BLD-CCNC: 116 U/L (ref 25–100)
ALT SERPL-CCNC: 28 U/L (ref 4–36)
ANION GAP SERPL CALCULATED.3IONS-SCNC: 9 MMOL/L (ref 3–16)
AST SERPL-CCNC: 29 U/L (ref 8–33)
BASOPHILS ABSOLUTE: 0 K/UL (ref 0–0.1)
BASOPHILS RELATIVE PERCENT: 0.4 %
BILIRUB SERPL-MCNC: 0.3 MG/DL (ref 0.3–1.2)
BUN BLDV-MCNC: 7 MG/DL (ref 6–20)
CALCIUM SERPL-MCNC: 8.3 MG/DL (ref 8.5–10.5)
CHLORIDE BLD-SCNC: 95 MMOL/L (ref 98–107)
CO2: 27 MMOL/L (ref 20–30)
CREAT SERPL-MCNC: 0.8 MG/DL (ref 0.4–1.2)
EOSINOPHILS ABSOLUTE: 0.1 K/UL (ref 0–0.4)
EOSINOPHILS RELATIVE PERCENT: 1.4 %
GFR AFRICAN AMERICAN: >59
GFR NON-AFRICAN AMERICAN: >59
GLOBULIN: 3.1 G/DL
GLUCOSE BLD-MCNC: 109 MG/DL (ref 74–106)
HCT VFR BLD CALC: 27 % (ref 40–54)
HEMOGLOBIN: 8 G/DL (ref 13–18)
IMMATURE GRANULOCYTES #: 0.1 K/UL
IMMATURE GRANULOCYTES %: 1.1 % (ref 0–5)
LYMPHOCYTES ABSOLUTE: 1.5 K/UL (ref 1.5–4)
LYMPHOCYTES RELATIVE PERCENT: 27.1 %
MCH RBC QN AUTO: 25 PG (ref 27–32)
MCHC RBC AUTO-ENTMCNC: 29.6 G/DL (ref 31–35)
MCV RBC AUTO: 84.4 FL (ref 80–100)
MONOCYTES ABSOLUTE: 0.6 K/UL (ref 0.2–0.8)
MONOCYTES RELATIVE PERCENT: 10.6 %
NEUTROPHILS ABSOLUTE: 3.4 K/UL (ref 2–7.5)
NEUTROPHILS RELATIVE PERCENT: 59.4 %
PDW BLD-RTO: 16.5 % (ref 11–16)
PLATELET # BLD: 359 K/UL (ref 150–400)
PMV BLD AUTO: 9.9 FL (ref 6–10)
POTASSIUM SERPL-SCNC: 4.3 MMOL/L (ref 3.4–5.1)
RBC # BLD: 3.2 M/UL (ref 4.5–6)
SODIUM BLD-SCNC: 131 MMOL/L (ref 136–145)
TOTAL PROTEIN: 5.8 G/DL (ref 6.4–8.3)
WBC # BLD: 5.7 K/UL (ref 4–11)

## 2021-01-22 PROCEDURE — 6370000000 HC RX 637 (ALT 250 FOR IP): Performed by: PHYSICIAN ASSISTANT

## 2021-01-22 PROCEDURE — 2580000003 HC RX 258: Performed by: PHYSICIAN ASSISTANT

## 2021-01-22 PROCEDURE — 1200000002 HC SEMI PRIVATE SWING BED

## 2021-01-22 PROCEDURE — 36415 COLL VENOUS BLD VENIPUNCTURE: CPT

## 2021-01-22 PROCEDURE — 80053 COMPREHEN METABOLIC PANEL: CPT

## 2021-01-22 PROCEDURE — 6360000002 HC RX W HCPCS: Performed by: PHYSICIAN ASSISTANT

## 2021-01-22 PROCEDURE — 85025 COMPLETE CBC W/AUTO DIFF WBC: CPT

## 2021-01-22 RX ORDER — BISACODYL 10 MG
10 SUPPOSITORY, RECTAL RECTAL DAILY PRN
Status: DISCONTINUED | OUTPATIENT
Start: 2021-01-22 | End: 2021-01-25 | Stop reason: HOSPADM

## 2021-01-22 RX ORDER — MAGNESIUM CARB/ALUMINUM HYDROX 105-160MG
30 TABLET,CHEWABLE ORAL ONCE
Status: DISCONTINUED | OUTPATIENT
Start: 2021-01-22 | End: 2021-01-25 | Stop reason: HOSPADM

## 2021-01-22 RX ORDER — LACTULOSE 10 G/15ML
20 SOLUTION ORAL ONCE
Status: DISCONTINUED | OUTPATIENT
Start: 2021-01-22 | End: 2021-01-25 | Stop reason: HOSPADM

## 2021-01-22 RX ADMIN — FOLIC ACID 1 MG: 1 TABLET ORAL at 09:00

## 2021-01-22 RX ADMIN — DOXYCYCLINE 100 MG: 100 CAPSULE ORAL at 09:00

## 2021-01-22 RX ADMIN — FERROUS SULFATE TAB EC 324 MG (65 MG FE EQUIVALENT) 324 MG: 324 (65 FE) TABLET DELAYED RESPONSE at 09:00

## 2021-01-22 RX ADMIN — DOXYCYCLINE 100 MG: 100 CAPSULE ORAL at 20:34

## 2021-01-22 RX ADMIN — METOPROLOL TARTRATE 12.5 MG: 25 TABLET, FILM COATED ORAL at 08:59

## 2021-01-22 RX ADMIN — METOPROLOL TARTRATE 12.5 MG: 25 TABLET, FILM COATED ORAL at 20:34

## 2021-01-22 RX ADMIN — HYDROXYZINE HYDROCHLORIDE 25 MG: 25 TABLET, FILM COATED ORAL at 02:03

## 2021-01-22 RX ADMIN — FAMOTIDINE 20 MG: 20 TABLET, FILM COATED ORAL at 20:34

## 2021-01-22 RX ADMIN — DEXTROSE MONOHYDRATE: 50 INJECTION, SOLUTION INTRAVENOUS at 22:20

## 2021-01-22 RX ADMIN — FAMOTIDINE 20 MG: 20 TABLET, FILM COATED ORAL at 09:00

## 2021-01-22 RX ADMIN — ENOXAPARIN SODIUM 40 MG: 40 INJECTION SUBCUTANEOUS at 09:07

## 2021-01-22 RX ADMIN — BUPRENORPHINE AND NALOXONE 2 TABLET: 8; 2 TABLET SUBLINGUAL at 09:00

## 2021-01-22 RX ADMIN — HYDROXYZINE HYDROCHLORIDE 25 MG: 25 TABLET, FILM COATED ORAL at 20:34

## 2021-01-22 RX ADMIN — FERROUS SULFATE TAB EC 324 MG (65 MG FE EQUIVALENT) 324 MG: 324 (65 FE) TABLET DELAYED RESPONSE at 17:55

## 2021-01-22 RX ADMIN — MELATONIN 3 MG: at 20:34

## 2021-01-22 RX ADMIN — ACETAMINOPHEN 650 MG: 325 TABLET, FILM COATED ORAL at 21:09

## 2021-01-22 RX ADMIN — MELATONIN 3 MG: at 02:03

## 2021-01-22 RX ADMIN — MIRTAZAPINE 15 MG: 15 TABLET, FILM COATED ORAL at 20:33

## 2021-01-22 RX ADMIN — HYDROXYZINE HYDROCHLORIDE 25 MG: 25 TABLET, FILM COATED ORAL at 10:32

## 2021-01-22 ASSESSMENT — PAIN SCALES - GENERAL
PAINLEVEL_OUTOF10: 6
PAINLEVEL_OUTOF10: 3

## 2021-01-22 NOTE — PROGRESS NOTES
Monday and today I have numerous attempts to contact [facility] for a follow up   I have been unsuccessful, jaime tried to call the pts infectious disease office and left 2 msgs  I have also called the pts family and have not been able to reach them.     I will continue to try to contact [facility]

## 2021-01-22 NOTE — PROGRESS NOTES
Pt signed release for smoking today. Nicotine patch removed from rt am. Pt waiting on mother to get here to go and smoke. Explained to pt he would have to walk to back deck or family could take him out in w/c.

## 2021-01-22 NOTE — PLAN OF CARE
Problem: Falls - Risk of:  Goal: Will remain free from falls  Description: Will remain free from falls  Outcome: Ongoing  Goal: Absence of physical injury  Description: Absence of physical injury  Outcome: Ongoing     Problem:  Activity:  Goal: Capacity to carry out activities will improve, regarding daily activities  Description: Capacity to carry out activities will improve, regarding daily activities  Outcome: Ongoing  Goal: Ability to maintain optimal joint mobility will improve  Description: Ability to maintain optimal joint mobility will improve  Outcome: Ongoing  Goal: Muscle strength will improve  Description: Muscle strength will improve  Outcome: Ongoing     Problem: Cardiac:  Goal: Hemodynamic stability will improve  Description: Hemodynamic stability will improve  Outcome: Ongoing  Goal: Ability to maintain an adequate cardiac output will improve  Description: Ability to maintain an adequate cardiac output will improve  Outcome: Ongoing  Goal: Signs and symptoms of infection will decrease  Description: Signs and symptoms of infection will decrease  Outcome: Ongoing  Goal: Will show no signs or symptoms of fluid imbalance  Description: Will show no signs or symptoms of fluid imbalance  Outcome: Ongoing     Problem: Coping:  Goal: Ability to verbalize feelings about condition will improve  Description: Ability to verbalize feelings about condition will improve  Outcome: Ongoing  Goal: Verbalizations of decreased anxiety will increase  Description: Verbalizations of decreased anxiety will increase  Outcome: Ongoing     Problem: Health Behavior:  Goal: Ability to identify changes in lifestyle to reduce recurrence of condition will improve  Description: Ability to identify changes in lifestyle to reduce recurrence of condition will improve  Outcome: Ongoing  Goal: Ability to manage health-related needs will improve  Description: Ability to manage health-related needs will improve  Outcome: Ongoing Problem: Physical Regulation:  Goal: Ability to avoid or minimize complications of infection will improve  Description: Ability to avoid or minimize complications of infection will improve  Outcome: Ongoing  Goal: Maintenance of adequate nutrition will improve  Description: Maintenance of adequate nutrition will improve  Outcome: Ongoing     Problem: Respiratory:  Goal: Ability to maintain adequate ventilation will improve  Description: Ability to maintain adequate ventilation will improve  Outcome: Ongoing     Problem: Role Relationship:  Goal: Ability to express an understanding of role expectations in relation to illness will improve  Description: Ability to express an understanding of role expectations in relation to illness will improve  Outcome: Ongoing     Problem: Sensory:  Goal: Pain level will decrease  Description: Pain level will decrease  Outcome: Ongoing     Problem: Infection:  Goal: Will remain free from infection  Description: Will remain free from infection  Outcome: Ongoing     Problem: Safety:  Goal: Free from accidental physical injury  Description: Free from accidental physical injury  Outcome: Ongoing  Goal: Free from intentional harm  Description: Free from intentional harm  Outcome: Ongoing     Problem: Daily Care:  Goal: Daily care needs are met  Description: Daily care needs are met  Outcome: Ongoing     Problem: Pain:  Goal: Patient's pain/discomfort is manageable  Description: Patient's pain/discomfort is manageable  Outcome: Ongoing     Problem: Skin Integrity:  Goal: Skin integrity will stabilize  Description: Skin integrity will stabilize  Outcome: Ongoing     Problem: Discharge Planning:  Goal: Patients continuum of care needs are met  Description: Patients continuum of care needs are met  Outcome: Ongoing

## 2021-01-22 NOTE — FLOWSHEET NOTE
01/21/21 2020   Assessment   Charting Type Shift assessment   Neurological   Neuro (WDL) WDL   [place] Coma Scale   Eye Opening 4   Best Verbal Response 5   Best Motor Response 6   [place] Coma Scale Score 15   HEENT   HEENT (WDL) WDL   Respiratory   Respiratory (WDL) X   Respiratory Pattern Regular   Respiratory Depth Normal   Respiratory Quality/Effort Unlabored   Chest Assessment Chest expansion symmetrical;Trachea midline   L Breath Sounds Clear   R Breath Sounds Clear   Breath Sounds   Right Upper Lobe Clear   Right Middle Lobe Clear   Right Lower Lobe Clear   Left Upper Lobe Clear   Left Lower Lobe Clear   Cough/Sputum   Cough None   Cardiac   Cardiac (WDL) WDL   Cardiac Regularity Regular   Cardiac Monitor   Bedside Cardiac Monitor On No   Telemetry Monitor On No   Gastrointestinal   Abdominal (WDL) WDL   Abdomen Inspection Flat   RUQ Bowel Sounds Active   LUQ Bowel Sounds Active   RLQ Bowel Sounds Active   LLQ Bowel Sounds Active   Peripheral Vascular   Peripheral Vascular (WDL) WDL   Edema Right lower extremity; Left lower extremity   RLE Edema Non-pitting   LLE Edema Non-pitting   Skin Color/Condition   Skin Color/Condition (WDL) WDL   Skin Color Appropriate for ethnicity   Skin Condition/Temp Dry; Warm   Skin Integrity   Skin Integrity (WDL) WDL   Musculoskeletal   Musculoskeletal (WDL) X   RL Extremity Weakness   LL Extremity Weakness   Genitourinary   Genitourinary (WDL) WDL   Flank Tenderness No   Suprapubic Tenderness No   Dysuria No   Urine Assessment   Incontinence No   Urine Color Yellow/straw   Urine Appearance Clear   Psychosocial   Psychosocial (WDL) WDL   Patient Behaviors Calm

## 2021-01-23 PROCEDURE — 1200000002 HC SEMI PRIVATE SWING BED

## 2021-01-23 PROCEDURE — 6360000002 HC RX W HCPCS: Performed by: PHYSICIAN ASSISTANT

## 2021-01-23 PROCEDURE — 6370000000 HC RX 637 (ALT 250 FOR IP): Performed by: PHYSICIAN ASSISTANT

## 2021-01-23 RX ADMIN — ENOXAPARIN SODIUM 40 MG: 40 INJECTION SUBCUTANEOUS at 08:31

## 2021-01-23 RX ADMIN — FERROUS SULFATE TAB EC 324 MG (65 MG FE EQUIVALENT) 324 MG: 324 (65 FE) TABLET DELAYED RESPONSE at 08:31

## 2021-01-23 RX ADMIN — HYDROXYZINE HYDROCHLORIDE 25 MG: 25 TABLET, FILM COATED ORAL at 08:38

## 2021-01-23 RX ADMIN — METOPROLOL TARTRATE 12.5 MG: 25 TABLET, FILM COATED ORAL at 20:30

## 2021-01-23 RX ADMIN — FOLIC ACID 1 MG: 1 TABLET ORAL at 08:32

## 2021-01-23 RX ADMIN — HYDROXYZINE HYDROCHLORIDE 25 MG: 25 TABLET, FILM COATED ORAL at 20:30

## 2021-01-23 RX ADMIN — MIRTAZAPINE 15 MG: 15 TABLET, FILM COATED ORAL at 20:30

## 2021-01-23 RX ADMIN — BUPRENORPHINE AND NALOXONE 2 TABLET: 8; 2 TABLET SUBLINGUAL at 08:31

## 2021-01-23 RX ADMIN — FAMOTIDINE 20 MG: 20 TABLET, FILM COATED ORAL at 20:30

## 2021-01-23 RX ADMIN — FAMOTIDINE 20 MG: 20 TABLET, FILM COATED ORAL at 08:32

## 2021-01-23 RX ADMIN — DOXYCYCLINE 100 MG: 100 CAPSULE ORAL at 08:31

## 2021-01-23 RX ADMIN — METOPROLOL TARTRATE 12.5 MG: 25 TABLET, FILM COATED ORAL at 08:32

## 2021-01-23 RX ADMIN — MELATONIN 3 MG: at 20:30

## 2021-01-23 RX ADMIN — FERROUS SULFATE TAB EC 324 MG (65 MG FE EQUIVALENT) 324 MG: 324 (65 FE) TABLET DELAYED RESPONSE at 16:01

## 2021-01-24 PROCEDURE — 6370000000 HC RX 637 (ALT 250 FOR IP): Performed by: PHYSICIAN ASSISTANT

## 2021-01-24 PROCEDURE — 2580000003 HC RX 258: Performed by: PHYSICIAN ASSISTANT

## 2021-01-24 PROCEDURE — 1200000002 HC SEMI PRIVATE SWING BED

## 2021-01-24 PROCEDURE — 6360000002 HC RX W HCPCS: Performed by: PHYSICIAN ASSISTANT

## 2021-01-24 RX ADMIN — DEXTROSE MONOHYDRATE: 50 INJECTION, SOLUTION INTRAVENOUS at 22:38

## 2021-01-24 RX ADMIN — BUPRENORPHINE AND NALOXONE 2 TABLET: 8; 2 TABLET SUBLINGUAL at 08:29

## 2021-01-24 RX ADMIN — METOPROLOL TARTRATE 12.5 MG: 25 TABLET, FILM COATED ORAL at 21:15

## 2021-01-24 RX ADMIN — ENOXAPARIN SODIUM 40 MG: 40 INJECTION SUBCUTANEOUS at 08:29

## 2021-01-24 RX ADMIN — DOCUSATE SODIUM 100 MG: 100 CAPSULE, LIQUID FILLED ORAL at 21:16

## 2021-01-24 RX ADMIN — HYDROXYZINE HYDROCHLORIDE 25 MG: 25 TABLET, FILM COATED ORAL at 21:14

## 2021-01-24 RX ADMIN — FAMOTIDINE 20 MG: 20 TABLET, FILM COATED ORAL at 21:14

## 2021-01-24 RX ADMIN — METOPROLOL TARTRATE 12.5 MG: 25 TABLET, FILM COATED ORAL at 08:29

## 2021-01-24 RX ADMIN — DOCUSATE SODIUM 100 MG: 100 CAPSULE, LIQUID FILLED ORAL at 08:29

## 2021-01-24 RX ADMIN — MELATONIN 3 MG: at 21:14

## 2021-01-24 RX ADMIN — FERROUS SULFATE TAB EC 324 MG (65 MG FE EQUIVALENT) 324 MG: 324 (65 FE) TABLET DELAYED RESPONSE at 08:29

## 2021-01-24 RX ADMIN — FERROUS SULFATE TAB EC 324 MG (65 MG FE EQUIVALENT) 324 MG: 324 (65 FE) TABLET DELAYED RESPONSE at 17:37

## 2021-01-24 RX ADMIN — MIRTAZAPINE 15 MG: 15 TABLET, FILM COATED ORAL at 21:14

## 2021-01-24 RX ADMIN — FAMOTIDINE 20 MG: 20 TABLET, FILM COATED ORAL at 08:30

## 2021-01-24 RX ADMIN — FOLIC ACID 1 MG: 1 TABLET ORAL at 08:29

## 2021-01-24 NOTE — FLOWSHEET NOTE
01/23/21 2045   Assessment   Charting Type Shift assessment   Neurological   Neuro (WDL) WDL   [place] Coma Scale   Eye Opening 4   Best Verbal Response 5   Best Motor Response 6   [place] Coma Scale Score 15   HEENT   HEENT (WDL) WDL   Respiratory   Respiratory (WDL) X   Respiratory Pattern Regular   Respiratory Depth Normal   Respiratory Quality/Effort Unlabored   Chest Assessment Chest expansion symmetrical;Trachea midline   L Breath Sounds Clear   R Breath Sounds Clear   Breath Sounds   Right Upper Lobe Clear   Right Middle Lobe Clear   Right Lower Lobe Clear   Left Upper Lobe Clear   Left Lower Lobe Clear   Cough/Sputum   Cough None   Cardiac   Cardiac (WDL) WDL   Cardiac Regularity Regular   Cardiac Monitor   Bedside Cardiac Monitor On No   Telemetry Monitor On No   Gastrointestinal   Abdominal (WDL) WDL   Abdomen Inspection Flat   RUQ Bowel Sounds Active   LUQ Bowel Sounds Active   RLQ Bowel Sounds Active   LLQ Bowel Sounds Active   Peripheral Vascular   Peripheral Vascular (WDL) WDL   Edema Right lower extremity; Left lower extremity   RLE Edema Non-pitting   LLE Edema Non-pitting   Skin Color/Condition   Skin Color/Condition (WDL) WDL   Skin Color Appropriate for ethnicity   Skin Condition/Temp Dry; Warm   Skin Integrity   Skin Integrity (WDL) WDL   Musculoskeletal   Musculoskeletal (WDL) X   RL Extremity Weakness   LL Extremity Weakness   Genitourinary   Genitourinary (WDL) WDL   Flank Tenderness No   Suprapubic Tenderness No   Dysuria No   Urine Assessment   Incontinence No   Urine Color Yellow/straw   Urine Appearance Clear   Psychosocial   Psychosocial (WDL) WDL   Patient Behaviors Calm

## 2021-01-25 VITALS
BODY MASS INDEX: 21.1 KG/M2 | OXYGEN SATURATION: 97 % | TEMPERATURE: 97.9 F | DIASTOLIC BLOOD PRESSURE: 53 MMHG | SYSTOLIC BLOOD PRESSURE: 120 MMHG | RESPIRATION RATE: 16 BRPM | HEIGHT: 72 IN | WEIGHT: 155.8 LBS | HEART RATE: 95 BPM

## 2021-01-25 PROBLEM — Z72.0 TOBACCO ABUSE: Status: ACTIVE | Noted: 2021-01-25

## 2021-01-25 LAB
A/G RATIO: 0.6 (ref 0.8–2)
ALBUMIN SERPL-MCNC: 2.4 G/DL (ref 3.4–4.8)
ALP BLD-CCNC: 113 U/L (ref 25–100)
ALT SERPL-CCNC: 31 U/L (ref 4–36)
ANION GAP SERPL CALCULATED.3IONS-SCNC: 10 MMOL/L (ref 3–16)
AST SERPL-CCNC: 32 U/L (ref 8–33)
BILIRUB SERPL-MCNC: <0.2 MG/DL (ref 0.3–1.2)
BUN BLDV-MCNC: 8 MG/DL (ref 6–20)
C-REACTIVE PROTEIN: 70.5 MG/L (ref 0–5.1)
CALCIUM SERPL-MCNC: 8.3 MG/DL (ref 8.5–10.5)
CHLORIDE BLD-SCNC: 98 MMOL/L (ref 98–107)
CO2: 26 MMOL/L (ref 20–30)
CREAT SERPL-MCNC: 0.7 MG/DL (ref 0.4–1.2)
GFR AFRICAN AMERICAN: >59
GFR NON-AFRICAN AMERICAN: >59
GLOBULIN: 3.7 G/DL
GLUCOSE BLD-MCNC: 124 MG/DL (ref 74–106)
HCT VFR BLD CALC: 27.4 % (ref 40–54)
HEMOGLOBIN: 8 G/DL (ref 13–18)
MCH RBC QN AUTO: 24.6 PG (ref 27–32)
MCHC RBC AUTO-ENTMCNC: 29.2 G/DL (ref 31–35)
MCV RBC AUTO: 84.3 FL (ref 80–100)
PDW BLD-RTO: 16.3 % (ref 11–16)
PLATELET # BLD: 395 K/UL (ref 150–400)
PMV BLD AUTO: 10 FL (ref 6–10)
POTASSIUM REFLEX MAGNESIUM: 3.8 MMOL/L (ref 3.4–5.1)
RBC # BLD: 3.25 M/UL (ref 4.5–6)
SEDIMENTATION RATE, ERYTHROCYTE: 50 MM/HR (ref 0–15)
SODIUM BLD-SCNC: 134 MMOL/L (ref 136–145)
TOTAL PROTEIN: 6.1 G/DL (ref 6.4–8.3)
WBC # BLD: 6.4 K/UL (ref 4–11)

## 2021-01-25 PROCEDURE — 85027 COMPLETE CBC AUTOMATED: CPT

## 2021-01-25 PROCEDURE — 80053 COMPREHEN METABOLIC PANEL: CPT

## 2021-01-25 PROCEDURE — 6370000000 HC RX 637 (ALT 250 FOR IP): Performed by: PHYSICIAN ASSISTANT

## 2021-01-25 PROCEDURE — 86140 C-REACTIVE PROTEIN: CPT

## 2021-01-25 PROCEDURE — 85652 RBC SED RATE AUTOMATED: CPT

## 2021-01-25 PROCEDURE — 6360000002 HC RX W HCPCS: Performed by: PHYSICIAN ASSISTANT

## 2021-01-25 PROCEDURE — 36415 COLL VENOUS BLD VENIPUNCTURE: CPT

## 2021-01-25 PROCEDURE — 99316 NF DSCHRG MGMT 30 MIN+: CPT | Performed by: INTERNAL MEDICINE

## 2021-01-25 RX ORDER — FAMOTIDINE 20 MG/1
20 TABLET, FILM COATED ORAL 2 TIMES DAILY
Qty: 60 TABLET | Refills: 0 | Status: SHIPPED | OUTPATIENT
Start: 2021-01-25

## 2021-01-25 RX ORDER — FERROUS SULFATE TAB EC 324 MG (65 MG FE EQUIVALENT) 324 (65 FE) MG
324 TABLET DELAYED RESPONSE ORAL 2 TIMES DAILY WITH MEALS
Qty: 60 TABLET | Refills: 0 | Status: SHIPPED | OUTPATIENT
Start: 2021-01-25 | End: 2021-02-24

## 2021-01-25 RX ORDER — FOLIC ACID 1 MG/1
1 TABLET ORAL DAILY
Qty: 30 TABLET | Refills: 0 | Status: SHIPPED | OUTPATIENT
Start: 2021-01-26

## 2021-01-25 RX ORDER — DOCUSATE SODIUM 100 MG/1
100 CAPSULE, LIQUID FILLED ORAL 2 TIMES DAILY
Qty: 60 CAPSULE | Refills: 0 | Status: SHIPPED | OUTPATIENT
Start: 2021-01-25

## 2021-01-25 RX ORDER — POLYETHYLENE GLYCOL 3350 17 G/17G
17 POWDER, FOR SOLUTION ORAL DAILY PRN
Qty: 527 G | Refills: 1 | Status: SHIPPED | OUTPATIENT
Start: 2021-01-25 | End: 2021-02-24

## 2021-01-25 RX ORDER — HYDROXYZINE HYDROCHLORIDE 25 MG/1
25 TABLET, FILM COATED ORAL 2 TIMES DAILY PRN
Qty: 60 TABLET | Refills: 0 | Status: SHIPPED | OUTPATIENT
Start: 2021-01-25 | End: 2021-02-24

## 2021-01-25 RX ORDER — MIRTAZAPINE 15 MG/1
15 TABLET, FILM COATED ORAL NIGHTLY
Qty: 30 TABLET | Refills: 0 | Status: SHIPPED | OUTPATIENT
Start: 2021-01-25

## 2021-01-25 RX ADMIN — FOLIC ACID 1 MG: 1 TABLET ORAL at 08:55

## 2021-01-25 RX ADMIN — HYDROXYZINE HYDROCHLORIDE 25 MG: 25 TABLET, FILM COATED ORAL at 06:17

## 2021-01-25 RX ADMIN — ENOXAPARIN SODIUM 40 MG: 40 INJECTION SUBCUTANEOUS at 08:56

## 2021-01-25 RX ADMIN — BUPRENORPHINE AND NALOXONE 2 TABLET: 8; 2 TABLET SUBLINGUAL at 08:52

## 2021-01-25 RX ADMIN — METOPROLOL TARTRATE 12.5 MG: 25 TABLET, FILM COATED ORAL at 08:55

## 2021-01-25 RX ADMIN — FERROUS SULFATE TAB EC 324 MG (65 MG FE EQUIVALENT) 324 MG: 324 (65 FE) TABLET DELAYED RESPONSE at 11:51

## 2021-01-25 RX ADMIN — LINACLOTIDE 145 MCG: 145 CAPSULE, GELATIN COATED ORAL at 06:12

## 2021-01-25 RX ADMIN — FAMOTIDINE 20 MG: 20 TABLET, FILM COATED ORAL at 08:54

## 2021-01-25 RX ADMIN — DOCUSATE SODIUM 100 MG: 100 CAPSULE, LIQUID FILLED ORAL at 08:56

## 2021-01-25 ASSESSMENT — PAIN SCALES - GENERAL: PAINLEVEL_OUTOF10: 0

## 2021-01-25 NOTE — DISCHARGE SUMMARY
Discharge Summary      Patient ID: Cecilia Viveros      Patient's PCP: No primary care provider on file. Admit Date: 1/15/2021     Discharge Date:  1/25/2021    Admitting Provider: Cb Sanford MD    Discharging Provider: EVERARDO Cai     Reason for this admission:   Acute bacterial endocarditis  Septic embolism    Discharge Diagnoses: Active Hospital Problems    Diagnosis Date Noted    Tobacco abuse [Z72.0] 01/25/2021    Malnutrition ([facility] [E46] 01/18/2021    History of intravenous drug abuse ([facility] [F19.11] 01/16/2021    HIV (human immunodeficiency virus infection) ([facility] [B20] 01/16/2021    Fever [R50.9] 01/16/2021    Acute bacterial endocarditis [I33.0] 01/16/2021    Septic embolism ([facility] Kathy Chalet 01/16/2021    Acute hepatitis C virus infection without hepatic coma [B17.10] 01/16/2021    Bacteremia [R78.81] 01/16/2021    Anemia [D64.9] 01/16/2021    Opioid dependence ([address] [F11.20] 01/16/2021    Pneumonia [J18.9] 01/16/2021    Dental caries [K02.9] 01/16/2021       Procedures:  XR CHEST (2 VW)   Final Result   Impression: Stable bibasilar airspace opacities and small right effusion. XR CHEST (2 VW)   Final Result      Patchy bibasilar opacities and small pleural effusions, greater on the right, possibly infectious. Consults:   IP CONSULT TO CASE MANAGEMENT  PHARMACY TO DOSE VANCOMYCIN  IP CONSULT TO DIETITIAN  IP CONSULT TO DIETITIAN    Briefly:   Mr. Joelle Rosales is a 32 yo male with PMH of tobacco abuse and IVDA with recently diagnosed Hepatitis C, HIV, acute bacterial endocarditis, bacteremia, and septic emboli who was admitted to this facility for completion of IV antibiotic therapy in a monitored setting. See details of hospital course below. Hospital Course: Active Hospital Problems    Diagnosis Date Noted    Tobacco abuse [Z72.0]  - Risks of continued smoking were discussed. Benefits of smoking cessation were discussed. He declines nicotine patches. 01/25/2021    Malnutrition ([facility] [E46]  - Dietitian followed throughout hospitalization   - Albumin 2.4   - encourage po intake and patient plans to continue drinking protein drink such as boost at home   - continue MVI  01/18/2021    History of intravenous drug abuse ([facility] [F19.11]  - history of opioid abuse. Continue suboxone which was initiated by Addiction Medicine at [facility]. F/u with addiction medicine as scheduled. - encourage ongoing cessation   -  Dianne Putnam consulted however patient declines any counseling or rehab 01/16/2021    HIV (human immunodeficiency virus infection) ([facility] [B20]  - Newly diagnosed while at [facility]   - Per [facility] chart review last CD4 count > 400 (622 on 12/28)  - Follow up with ID as  outpatient  01/16/2021    Fever [R50.9]  - on arrival to this facility patient with low grade fever 99.5-100.8   - confirmed no PICC line in place   - on PCN G as below in setting of endocarditis, bacteremia, septic emboli   - Did call and discuss the case with CT surgery team at [facility] Dr. Alan Perdomo 1/16 and he recommends blood cultures but otherwise somewhat expects low grade fever in this patient with TV involvement and septic emboli. He had temp 100.9 at [facility] over last few days prior to transfer to this facility.   - CXR with evidence of possible pneumonia - did review recent CXRs at outside facility including most recent 1/11/21 which shows bibasilar atelectasis and small bilateral pleural effusions   - Continue PCN G. Added Vancomycin and PO doxycycline with suspected pneumonia and ongoing fever. Blood cultures negative and Vancomycin discontinued on 1/18. Completed 7 day course of doxycycline for pneumonia. - Did review last ID note from [facility] - ID signed off on this case on 12/31/20 with recommendations for 4 weeks PCN G; end date 1/25/21  - patient did continue to have intermittent low grade fever during hospital course with last known fever 100.9 on 1/21/21.  Ryland Velarde PA-C discussed case with [facility] ID on 1/20 and per chart review fever felt to be related to initial high burden of infection especially in abscene of any new symptoms and CRP trending down. - F/u with ID as scheduled 01/16/2021    Acute bacterial endocarditis [I33.0]  - ECHO with TV and AV vegetations  - Blood cultures streptococcus gordoni 12/24 and repeat cultures 12/28 no growt  - CT/CTA head, CTA neck no acute findings  - CT chest multiple septic pulmonary emboli  - MRI chest without evidence of osteomyelitis 12/31    - Managed by [facility] Cardiothoracic surgery team from 12/28 to 1/15  - Per infectious disease recommendations he was started on penicillin G 12/28    - due to persistent fevers he underwent angiovac debulking of tricuspid valve with Dr. Gerard Irene on 1/7   - Continue IV PCN G until 1/25/21  - follow up with Dr. Petey Siu 3/3/21 @ 11:30 AM (Will have echo first at 11:30 then see Dr. Petey Siu)   - Follow up with ID Yocasta Beto MUNOZ 2/10/21 @ 2pm  - CBC with diff, CMP, CRP weekly on mondays (CRP 1/11 138.5 --> 1/18 68 --> 1/25 pending)  01/16/2021    Septic embolism ([facility]) Hope Jones  - see above   - on room air 01/16/2021    Acute hepatitis C virus infection without hepatic coma [B17.10]  - follow up as outpatient with ID 01/16/2021    Bacteremia [R78.81]  - Blood cx + streptococcus gordoni 12/24 and clear since 12/28  - Repeat blood cx x 2 1/16 negative   01/16/2021    Anemia [D64.9]  - hgb 6.9 on 1/16--> 7.2 on 1/18  - blood transfusion ordered 1/16, but discontinued due to questionable transfusion reaction  vs possible panic attack.  Patient declined transfusion.   - iron studies with evidence of deficiency; received IV venofer 1/16 - 1/18 and currently on PO Iron  - also evidence of folate deficiency and started on daily folate   - continue GI ppx 01/16/2021    Opioid dependence ([facility] [F11.20]  - continue suboxone and follow up with [facility] addiction medicine as scheduled 01/16/2021    Pneumonia [J18.9]  - CXR 1/16 with patchy bibasilar opacities with small pleural effusions, greater on the right, possibly infectious  - Covered empirically with vancomycin and doxycycline in addition to penicillin G until blood cultures returned. Blood cultures negative. Vancomycin discontinued. Completed course of doxycycline. This was discussed with Dr. Hafsa Siegel, [facility] ID on 1/18 and he agreed with plan. 01/16/2021    Dental caries [K02.9]  - Panorex at [facility] with subtle lucency in tooth #2 which could represent dental caries. Follow up with dentistry as outpatient 01/16/2021       Vital Signs  Temp: 97.9 °F (36.6 °C)  Pulse: 95  Resp: 16  BP: (!) 120/53  SpO2: 97 %  O2 Device: None (Room air)       Vital signs reviewed in electronic chart. Physical exam  Constitutional:  Well developed, thin male lying in bed, no acute distress. Eyes:  PERRL, conjunctiva normal, EOMI. HENT:  Atraumatic, external ears normal, external nose/nares normal, oropharynx moist, no pharyngeal exudates. Temporal wasting noted. Neck:  Supple. No JVD or thyromegaly. Respiratory:  No respiratory distress on room air, normal breath sounds, no rales, no wheezing. Cardiovascular:  Normal rate, normal rhythm, no murmurs, no gallops, no rubs. GI:  Soft, nondistended, normal bowel sounds, nontender, no organomegaly, no mass. :  No costovertebral angle tenderness. Musculoskeletal:  No edema, no tenderness, no obvious deformities. Patient is moving all extremities. Integument:  Well hydrated, no rash. Pale. Neurologic:  Alert & oriented x 3,  no focal deficits noted. Strength is equal throughout. Psychiatric:  Speech and behavior appropriate. Disposition: home  Discharged Condition: Stable  Activity: activity as tolerated  Diet: regular diet  Follow Up:   F/u [address] infectious disease 02/10/2021 2:00 pm, [address]  [facility] 5th floor wing [place], [phone]     F/u Dr. Geraldine Ceballos 03/03/21, [phone][facility] (ECHO to be done at 1130 am PRIOR to appointment with Dr. Anai Borjas), bring bottles of medications to this appointment, additional instructions for this appointment found in discharge summary. Please review prior to appointment     F/u Dr Jewel Miles at [facility] 1/26/2021 @ 1pm. [phone] [facility], [address] [place] [address] 46    F/u with your dentist in 2-3 weeks for dental caries    Labs:  For convenience and continuity at follow-up the following most recent labs are provided:    CBC:   Lab Results   Component Value Date    WBC 6.4 01/25/2021    HGB 8.0 01/25/2021    HCT 27.4 01/25/2021     01/25/2021       RENAL:   Lab Results   Component Value Date     01/25/2021    K 3.8 01/25/2021    CL 98 01/25/2021    CO2 26 01/25/2021    BUN 8 01/25/2021    CREATININE 0.7 01/25/2021         Discharge Medications:      Current Discharge Medication List           Details   ferrous sulfate 324 (65 Fe) MG EC tablet Take 1 tablet by mouth 2 times daily (with meals)  Qty: 60 tablet, Refills: 0      folic acid (FOLVITE) 1 MG tablet Take 1 tablet by mouth daily  Qty: 30 tablet, Refills: 0      polyethylene glycol (GLYCOLAX) 17 g packet Take 17 g by mouth daily as needed for Constipation  Qty: 527 g, Refills: 1      Multiple Vitamin (MVI, CELEBRATE, CHEWABLE TABLET) Take 1 tablet by mouth daily  Qty: 30 tablet, Refills: 0      famotidine (PEPCID) 20 MG tablet Take 1 tablet by mouth 2 times daily  Qty: 60 tablet, Refills: 0              Details   docusate sodium (COLACE) 100 MG capsule Take 1 capsule by mouth 2 times daily  Qty: 60 capsule, Refills: 0      hydrOXYzine (ATARAX) 25 MG tablet Take 1 tablet by mouth 2 times daily as needed for Anxiety  Qty: 60 tablet, Refills: 0      metoprolol tartrate (LOPRESSOR) 25 MG tablet Take 0.5 tablets by mouth 2 times daily  Qty: 30 tablet, Refills: 0      mirtazapine (REMERON) 15 MG tablet Take 1 tablet by mouth nightly  Qty: 30 tablet, Refills: 0              Details   bictegravir-emtricitab-tenofovir alafenamide (BIKTARVY) -25 MG TABS per tablet Take 1 tablet by mouth daily      buprenorphine-naloxone (SUBOXONE) 8-2 MG FILM SL film Place 2 Film under the tongue daily. acetaminophen (TYLENOL) 325 MG tablet Take 650 mg by mouth every 6 hours as needed for Pain      naloxone 4 MG/0.1ML LIQD nasal spray 1 spray by Nasal route as needed for Opioid Reversal            Patient was seen and examined by Dr. Gilberto Stover and plan of care reviewed. Signed:  Electronically signed by EVERARDO Hedrick on 1/25/2021 at 11:16 AM       Thank you No primary care provider on file. for the opportunity to be involved in this patient's care. If you have any questions or concerns please feel free to contact me at [phone].

## 2021-01-27 LAB
EKG ATRIAL RATE: 133 BPM
EKG DIAGNOSIS: NORMAL
EKG P AXIS: 47 DEGREES
EKG P-R INTERVAL: 170 MS
EKG Q-T INTERVAL: 294 MS
EKG QRS DURATION: 80 MS
EKG QTC CALCULATION (BAZETT): 437 MS
EKG R AXIS: 26 DEGREES
EKG T AXIS: 47 DEGREES
EKG VENTRICULAR RATE: 133 BPM

## 2021-04-13 ENCOUNTER — TRANSCRIBE ORDERS (OUTPATIENT)
Dept: CARDIAC REHAB | Facility: HOSPITAL | Age: 26
End: 2021-04-13

## 2021-04-13 DIAGNOSIS — Z95.4 S/P TRICUSPID VALVE REPLACEMENT: Primary | ICD-10-CM

## 2021-04-13 DIAGNOSIS — Z95.2 S/P AVR (AORTIC VALVE REPLACEMENT): ICD-10-CM
